# Patient Record
Sex: FEMALE | Race: WHITE | NOT HISPANIC OR LATINO | Employment: STUDENT | URBAN - METROPOLITAN AREA
[De-identification: names, ages, dates, MRNs, and addresses within clinical notes are randomized per-mention and may not be internally consistent; named-entity substitution may affect disease eponyms.]

---
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
## 2020-08-17 ENCOUNTER — OFFICE VISIT (OUTPATIENT)
Dept: FAMILY MEDICINE CLINIC | Facility: CLINIC | Age: 20
End: 2020-08-17

## 2020-08-17 VITALS
OXYGEN SATURATION: 98 % | DIASTOLIC BLOOD PRESSURE: 74 MMHG | BODY MASS INDEX: 18.97 KG/M2 | TEMPERATURE: 98.9 F | HEART RATE: 87 BPM | RESPIRATION RATE: 16 BRPM | SYSTOLIC BLOOD PRESSURE: 117 MMHG | WEIGHT: 125.2 LBS | HEIGHT: 68 IN

## 2020-08-17 DIAGNOSIS — Z00.01 ENCOUNTER FOR GENERAL ADULT MEDICAL EXAMINATION WITH ABNORMAL FINDINGS: Primary | ICD-10-CM

## 2020-08-17 DIAGNOSIS — R59.1 LYMPHADENOPATHY: ICD-10-CM

## 2020-08-17 LAB
ALBUMIN SERPL-MCNC: 4.5 G/DL (ref 3.5–5.2)
ALBUMIN/GLOB SERPL: 1.9 G/DL
ALP SERPL-CCNC: 61 U/L (ref 39–117)
ALT SERPL W P-5'-P-CCNC: 18 U/L (ref 1–33)
ANION GAP SERPL CALCULATED.3IONS-SCNC: 9.1 MMOL/L (ref 5–15)
AST SERPL-CCNC: 18 U/L (ref 1–32)
BASOPHILS # BLD AUTO: 0.02 10*3/MM3 (ref 0–0.2)
BASOPHILS NFR BLD AUTO: 0.3 % (ref 0–1.5)
BILIRUB SERPL-MCNC: 0.7 MG/DL (ref 0–1.2)
BUN SERPL-MCNC: 13 MG/DL (ref 6–20)
BUN/CREAT SERPL: 15.7 (ref 7–25)
CALCIUM SPEC-SCNC: 9.9 MG/DL (ref 8.6–10.5)
CHLORIDE SERPL-SCNC: 107 MMOL/L (ref 98–107)
CO2 SERPL-SCNC: 25.9 MMOL/L (ref 22–29)
CREAT SERPL-MCNC: 0.83 MG/DL (ref 0.57–1)
DEPRECATED RDW RBC AUTO: 41.6 FL (ref 37–54)
EOSINOPHIL # BLD AUTO: 0.05 10*3/MM3 (ref 0–0.4)
EOSINOPHIL NFR BLD AUTO: 0.8 % (ref 0.3–6.2)
ERYTHROCYTE [DISTWIDTH] IN BLOOD BY AUTOMATED COUNT: 12.3 % (ref 12.3–15.4)
GFR SERPL CREATININE-BSD FRML MDRD: 88 ML/MIN/1.73
GLOBULIN UR ELPH-MCNC: 2.4 GM/DL
GLUCOSE SERPL-MCNC: 89 MG/DL (ref 65–99)
HCT VFR BLD AUTO: 39.1 % (ref 34–46.6)
HGB BLD-MCNC: 12.9 G/DL (ref 12–15.9)
IMM GRANULOCYTES # BLD AUTO: 0.01 10*3/MM3 (ref 0–0.05)
IMM GRANULOCYTES NFR BLD AUTO: 0.2 % (ref 0–0.5)
LYMPHOCYTES # BLD AUTO: 1.43 10*3/MM3 (ref 0.7–3.1)
LYMPHOCYTES NFR BLD AUTO: 22.6 % (ref 19.6–45.3)
MCH RBC QN AUTO: 30.6 PG (ref 26.6–33)
MCHC RBC AUTO-ENTMCNC: 33 G/DL (ref 31.5–35.7)
MCV RBC AUTO: 92.7 FL (ref 79–97)
MONOCYTES # BLD AUTO: 0.44 10*3/MM3 (ref 0.1–0.9)
MONOCYTES NFR BLD AUTO: 6.9 % (ref 5–12)
NEUTROPHILS NFR BLD AUTO: 4.39 10*3/MM3 (ref 1.7–7)
NEUTROPHILS NFR BLD AUTO: 69.2 % (ref 42.7–76)
NRBC BLD AUTO-RTO: 0 /100 WBC (ref 0–0.2)
PLATELET # BLD AUTO: 153 10*3/MM3 (ref 140–450)
PMV BLD AUTO: 12.9 FL (ref 6–12)
POTASSIUM SERPL-SCNC: 5.1 MMOL/L (ref 3.5–5.2)
PROT SERPL-MCNC: 6.9 G/DL (ref 6–8.5)
RBC # BLD AUTO: 4.22 10*6/MM3 (ref 3.77–5.28)
SODIUM SERPL-SCNC: 142 MMOL/L (ref 136–145)
TSH SERPL DL<=0.05 MIU/L-ACNC: 2.58 UIU/ML (ref 0.27–4.2)
WBC # BLD AUTO: 6.34 10*3/MM3 (ref 3.4–10.8)

## 2020-08-17 PROCEDURE — 99214 OFFICE O/P EST MOD 30 MIN: CPT | Performed by: NURSE PRACTITIONER

## 2020-08-17 PROCEDURE — 85025 COMPLETE CBC W/AUTO DIFF WBC: CPT | Performed by: NURSE PRACTITIONER

## 2020-08-17 PROCEDURE — 80053 COMPREHEN METABOLIC PANEL: CPT | Performed by: NURSE PRACTITIONER

## 2020-08-17 PROCEDURE — 84443 ASSAY THYROID STIM HORMONE: CPT | Performed by: NURSE PRACTITIONER

## 2020-08-17 NOTE — PATIENT INSTRUCTIONS
Lymphadenopathy    Lymphadenopathy means that your lymph glands are swollen or larger than normal (enlarged). Lymph glands, also called lymph nodes, are collections of tissue that filter bacteria, viruses, and waste from your bloodstream. They are part of your body's disease-fighting system (immune system), which protects your body from germs.  There may be different causes of lymphadenopathy, depending on where it is in your body. Some types go away on their own. Lymphadenopathy can occur anywhere that you have lymph glands, including these areas:  · Neck (cervical lymphadenopathy).  · Chest (mediastinal lymphadenopathy).  · Lungs (hilar lymphadenopathy).  · Underarms (axillary lymphadenopathy).  · Groin (inguinal lymphadenopathy).  When your immune system responds to germs, infection-fighting cells and fluid build up in your lymph glands. This causes some swelling and enlargement. If the lymph glands do not go back to normal after you have an infection or disease, your health care provider may do tests. These tests help to monitor your condition and find the reason why the glands are still swollen and enlarged.  Follow these instructions at home:  · Get plenty of rest.  · Take over-the-counter and prescription medicines only as told by your health care provider. Your health care provider may recommend over-the-counter medicines for pain.  · If directed, apply heat to swollen lymph glands as often as told by your health care provider. Use the heat source that your health care provider recommends, such as a moist heat pack or a heating pad.  ? Place a towel between your skin and the heat source.  ? Leave the heat on for 20-30 minutes.  ? Remove the heat if your skin turns bright red. This is especially important if you are unable to feel pain, heat, or cold. You may have a greater risk of getting burned.  · Check your affected lymph glands every day for changes. Check other lymph gland areas as told by your health  care provider. Check for changes such as:  ? More swelling.  ? Sudden increase in size.  ? Redness or pain.  ? Hardness.  · Keep all follow-up visits as told by your health care provider. This is important.  Contact a health care provider if you have:  · Swelling that gets worse or spreads to other areas.  · Problems with breathing.  · Lymph glands that:  ? Are still swollen after 2 weeks.  ? Have suddenly gotten bigger.  ? Are red, painful, or hard.  · A fever or chills.  · Fatigue.  · A sore throat.  · Pain in your abdomen.  · Weight loss.  · Night sweats.  Get help right away if you have:  · Fluid leaking from an enlarged lymph gland.  · Severe pain.  · Chest pain.  · Shortness of breath.  Summary  · Lymphadenopathy means that your lymph glands are swollen or larger than normal (enlarged).  · Lymph glands (also called lymph nodes) are collections of tissue that filter bacteria, viruses, and waste from the bloodstream. They are part of your body's disease-fighting system (immune system).  · Lymphadenopathy can occur anywhere that you have lymph glands.  · If your enlarged and swollen lymph glands do not go back to normal after you have an infection or disease, your health care provider may do tests to monitor your condition and find the reason why the glands are still swollen and enlarged.  · Check your affected lymph glands every day for changes. Check other lymph gland areas as told by your health care provider.  This information is not intended to replace advice given to you by your health care provider. Make sure you discuss any questions you have with your health care provider.  Document Released: 09/26/2009 Document Revised: 11/30/2018 Document Reviewed: 11/02/2018  Elsevier Patient Education © 2020 [employer]

## 2020-08-17 NOTE — PROGRESS NOTES
"Subjective   Maleguille Soni is a 19 y.o. female presents for   Chief Complaint   Patient presents with   • Annual Exam   • Groin Swelling       Health Maintenance Due   Topic Date Due   • HPV VACCINES (1 - Female 2-dose series) 11/03/2010   • HEPATITIS C SCREENING  08/17/2020   • CHLAMYDIA SCREENING  08/17/2020   • INFLUENZA VACCINE  08/01/2020       History of Present Illness   Pt present for new patient exam.  She reports history of lymphnode swelling as a child with multiple labs completed, but does not remember treatment.  Today she reports right groin lymph node x 9 months but progressively worsening with rle pain and numbness.  She was seen at urgent care and prescribed keflex without improvement.  She reports ongoing nausea, that improves with avoidance of dairy products and denies recent fevers or other associated symptoms.  Pt denies new piercing or new tatoos.  Miscarriage 4/20, no D and C required and states she has not had a pelvic exam since the age of 16.  She denies vaginal discharge, itching, or odor.  LMP 8/5/20.  Pt works out in the gym several days a week and reports today felt a pop in right groin  and increase in pain in right groin that radiated down leg.  Vitals:    08/17/20 1342 08/17/20 1348   BP: 113/72 117/74   BP Location: Left arm Right arm   Patient Position: Sitting Sitting   Cuff Size: Adult Adult   Pulse: 88 87   Resp: 16    Temp: 98.9 °F (37.2 °C)    TempSrc: Infrared    SpO2: 98%    Weight: 56.8 kg (125 lb 3.2 oz)    Height: 172 cm (67.72\")      Body mass index is 19.2 kg/m².    Current Outpatient Medications on File Prior to Visit   Medication Sig Dispense Refill   • busPIRone (BUSPAR) 10 MG tablet Take 10 mg by mouth 3 (Three) Times a Day.     • [DISCONTINUED] cephalexin (KEFLEX) 500 MG capsule Take 1 capsule by mouth 3 (Three) Times a Day. 30 capsule 0     No current facility-administered medications on file prior to visit.        The following portions of the patient's " history were reviewed and updated as appropriate: allergies, current medications, past family history, past medical history, past social history, past surgical history and problem list.    Review of Systems   Constitutional: Negative for chills and fever.   HENT: Negative for sinus pressure and sore throat.    Eyes: Negative for blurred vision.   Respiratory: Negative for cough and shortness of breath.    Cardiovascular: Negative for chest pain.   Gastrointestinal: Positive for nausea. Negative for abdominal pain.   Endocrine: Negative.    Genitourinary:        Right groin swollen lymph node   Musculoskeletal: Negative for arthralgias and joint swelling.   Skin: Negative for color change.   Allergic/Immunologic: Negative.    Neurological: Positive for numbness (RLE). Negative for dizziness.   Hematological: Positive for adenopathy (right pelvis/groin area).   Psychiatric/Behavioral: Negative for behavioral problems.       Objective   Physical Exam   Constitutional: She is oriented to person, place, and time. She appears well-developed and well-nourished.   HENT:   Head: Normocephalic and atraumatic.   Right Ear: External ear normal.   Left Ear: External ear normal.   Nose: Nose normal.   Eyes: Pupils are equal, round, and reactive to light. Conjunctivae and EOM are normal.   Neck: Normal range of motion. Neck supple. No thyromegaly present.   Cardiovascular: Normal rate, regular rhythm, normal heart sounds and intact distal pulses.   Pulmonary/Chest: Effort normal and breath sounds normal.   Abdominal: Soft. Bowel sounds are normal. There is no hepatosplenomegaly. There is no tenderness. There is no guarding.   Musculoskeletal: Normal range of motion.   Bilateral lower extremity strength and DTR equal   Lymphadenopathy:     She has no cervical adenopathy.        Right: Inguinal (3 palpable, firm lymphnodes) adenopathy present.        Left: Inguinal (1 small firm lymphnode) adenopathy present.   Neurological: She is  alert and oriented to person, place, and time.   Skin: Skin is warm and dry.   Psychiatric: She has a normal mood and affect. Her behavior is normal. Judgment and thought content normal.   Nursing note and vitals reviewed.    PHQ-9 Total Score: 0    Assessment/Plan   Henrik was seen today for annual exam and groin swelling.    Diagnoses and all orders for this visit:    Encounter for general adult medical examination with abnormal findings  -     TSH    Lymphadenopathy  -     CBC Auto Differential  -     Comprehensive Metabolic Panel  -     CT Abdomen Pelvis With Contrast; Future        Patient Instructions   Lymphadenopathy    Lymphadenopathy means that your lymph glands are swollen or larger than normal (enlarged). Lymph glands, also called lymph nodes, are collections of tissue that filter bacteria, viruses, and waste from your bloodstream. They are part of your body's disease-fighting system (immune system), which protects your body from germs.  There may be different causes of lymphadenopathy, depending on where it is in your body. Some types go away on their own. Lymphadenopathy can occur anywhere that you have lymph glands, including these areas:  · Neck (cervical lymphadenopathy).  · Chest (mediastinal lymphadenopathy).  · Lungs (hilar lymphadenopathy).  · Underarms (axillary lymphadenopathy).  · Groin (inguinal lymphadenopathy).  When your immune system responds to germs, infection-fighting cells and fluid build up in your lymph glands. This causes some swelling and enlargement. If the lymph glands do not go back to normal after you have an infection or disease, your health care provider may do tests. These tests help to monitor your condition and find the reason why the glands are still swollen and enlarged.  Follow these instructions at home:  · Get plenty of rest.  · Take over-the-counter and prescription medicines only as told by your health care provider. Your health care provider may recommend  over-the-counter medicines for pain.  · If directed, apply heat to swollen lymph glands as often as told by your health care provider. Use the heat source that your health care provider recommends, such as a moist heat pack or a heating pad.  ? Place a towel between your skin and the heat source.  ? Leave the heat on for 20-30 minutes.  ? Remove the heat if your skin turns bright red. This is especially important if you are unable to feel pain, heat, or cold. You may have a greater risk of getting burned.  · Check your affected lymph glands every day for changes. Check other lymph gland areas as told by your health care provider. Check for changes such as:  ? More swelling.  ? Sudden increase in size.  ? Redness or pain.  ? Hardness.  · Keep all follow-up visits as told by your health care provider. This is important.  Contact a health care provider if you have:  · Swelling that gets worse or spreads to other areas.  · Problems with breathing.  · Lymph glands that:  ? Are still swollen after 2 weeks.  ? Have suddenly gotten bigger.  ? Are red, painful, or hard.  · A fever or chills.  · Fatigue.  · A sore throat.  · Pain in your abdomen.  · Weight loss.  · Night sweats.  Get help right away if you have:  · Fluid leaking from an enlarged lymph gland.  · Severe pain.  · Chest pain.  · Shortness of breath.  Summary  · Lymphadenopathy means that your lymph glands are swollen or larger than normal (enlarged).  · Lymph glands (also called lymph nodes) are collections of tissue that filter bacteria, viruses, and waste from the bloodstream. They are part of your body's disease-fighting system (immune system).  · Lymphadenopathy can occur anywhere that you have lymph glands.  · If your enlarged and swollen lymph glands do not go back to normal after you have an infection or disease, your health care provider may do tests to monitor your condition and find the reason why the glands are still swollen and enlarged.  · Check your  affected lymph glands every day for changes. Check other lymph gland areas as told by your health care provider.  This information is not intended to replace advice given to you by your health care provider. Make sure you discuss any questions you have with your health care provider.  Document Released: 09/26/2009 Document Revised: 11/30/2018 Document Reviewed: 11/02/2018  Elsevier Patient Education © 2020 [employer]

## 2020-10-13 ENCOUNTER — APPOINTMENT (OUTPATIENT)
Dept: CT IMAGING | Facility: HOSPITAL | Age: 20
End: 2020-10-13

## 2020-10-13 ENCOUNTER — HOSPITAL ENCOUNTER (EMERGENCY)
Facility: HOSPITAL | Age: 20
Discharge: HOME | End: 2020-10-13
Admitting: EMERGENCY MEDICINE

## 2020-10-13 VITALS
HEART RATE: 59 BPM | RESPIRATION RATE: 16 BRPM | OXYGEN SATURATION: 100 % | HEIGHT: 68 IN | TEMPERATURE: 98.4 F | WEIGHT: 123.68 LBS | BODY MASS INDEX: 18.74 KG/M2 | DIASTOLIC BLOOD PRESSURE: 82 MMHG | SYSTOLIC BLOOD PRESSURE: 135 MMHG

## 2020-10-13 DIAGNOSIS — R51.9 NONINTRACTABLE HEADACHE, UNSPECIFIED CHRONICITY PATTERN, UNSPECIFIED HEADACHE TYPE: ICD-10-CM

## 2020-10-13 DIAGNOSIS — S00.83XA CONTUSION OF JAW, INITIAL ENCOUNTER: Primary | ICD-10-CM

## 2020-10-13 DIAGNOSIS — M54.2 NECK PAIN: ICD-10-CM

## 2020-10-13 PROCEDURE — 99283 EMERGENCY DEPT VISIT LOW MDM: CPT

## 2020-10-13 PROCEDURE — 70486 CT MAXILLOFACIAL W/O DYE: CPT

## 2020-10-13 PROCEDURE — 70450 CT HEAD/BRAIN W/O DYE: CPT

## 2020-10-13 PROCEDURE — 72125 CT NECK SPINE W/O DYE: CPT

## 2020-10-13 RX ORDER — ACETAMINOPHEN 500 MG
1000 TABLET ORAL ONCE
Status: COMPLETED | OUTPATIENT
Start: 2020-10-13 | End: 2020-10-13

## 2020-10-13 RX ADMIN — ACETAMINOPHEN 1000 MG: 500 TABLET, FILM COATED ORAL at 14:31

## 2020-10-13 NOTE — ED PROVIDER NOTES
Subjective   Chief Complaint: Headache    Patient is a 19 year old  female with no significant past medical history presents with headache and chin pain after falling today on her skateboard.  Patient states that she was on her skateboard when she fell forward hitting her chin on the pavement.  Patient denies LOC or syncope.  Patient states that she has pain to her chin, scribes as a constant throbbing pain that she rates 6/10.  Patient also complains of headache that is all over, denies thunderclap onset or worst headache of her life.  She rates her headache a 5/10.  Patient also reports some neck stiffness, states that she has had neck problems for a couple months, currently sees a chiropractor, states she was adjusted yesterday.  She denies any blurry vision or dizziness, does report some lightheadedness.  Patient denies any nausea vomiting.  Patient denies any numbness and tingling in her extremities.    Location: Head, jaw    Quality: Throbbing    Duration: Today    Timing: Constant    Severity: Moderate    Associated Symptoms: Lightheadedness    PCP: Joycelyn Barraza      History provided by:  Patient      Review of Systems   Constitutional: Negative for fever.   HENT: Negative for sore throat and trouble swallowing.    Respiratory: Negative for shortness of breath and wheezing.    Cardiovascular: Negative for chest pain.   Gastrointestinal: Negative for abdominal pain.   Genitourinary: Negative for dysuria.   Musculoskeletal: Positive for neck stiffness.        Chin/jaw pain   Skin: Negative for rash.   Neurological: Positive for light-headedness and headaches.   Psychiatric/Behavioral: Negative for behavioral problems.   All other systems reviewed and are negative.      Past Medical History:   Diagnosis Date   • Anxiety    • Depression    • Miscarriage        Allergies   Allergen Reactions   • Latex Hives   • Menthol Hives and GI Intolerance       Past Surgical History:   Procedure Laterality Date   •  SHOULDER ARTHROSCOPY         Family History   Problem Relation Age of Onset   • Mental illness Mother    • Stroke Mother    • Heart disease Father    • Hypertension Father    • Mental illness Sister    • Cancer Maternal Grandmother         colon   • Cancer Maternal Grandfather         bone   • Cancer Paternal Grandfather         prostate       Social History     Socioeconomic History   • Marital status: Single     Spouse name: Not on file   • Number of children: Not on file   • Years of education: Not on file   • Highest education level: Not on file   Tobacco Use   • Smoking status: Never Smoker   • Smokeless tobacco: Never Used   Substance and Sexual Activity   • Alcohol use: Yes     Frequency: Monthly or less     Drinks per session: 3 or 4     Binge frequency: Never   • Drug use: Never   • Sexual activity: Yes     Partners: Male     Birth control/protection: Condom           Objective   Physical Exam  Vitals signs reviewed.   Constitutional:       Appearance: Normal appearance. She is well-developed and normal weight. She is not ill-appearing or toxic-appearing.   HENT:      Head: Normocephalic.      Comments: Abrasion with soft tissue swelling to chin.  Patient has symmetric jaw movements, no palpable click at TMJ with jaw opening  Eyes:      Pupils: Pupils are equal, round, and reactive to light.   Neck:      Musculoskeletal: Normal range of motion. No neck rigidity or muscular tenderness.      Comments: Cervical spine: No midline tenderness to palpation. No step-offs or deformities. Pain-free range of motion.  Cardiovascular:      Rate and Rhythm: Normal rate and regular rhythm.      Pulses: Normal pulses.      Heart sounds: Normal heart sounds.   Pulmonary:      Effort: Pulmonary effort is normal. No respiratory distress.      Breath sounds: Normal breath sounds. No wheezing.   Abdominal:      General: Bowel sounds are normal.      Palpations: Abdomen is soft.      Tenderness: There is no abdominal tenderness.  "  Musculoskeletal:      Comments: Lumbar spine: No step-offs or deformities, no midline tenderness to palpation.  Bilateral lower extremities: Negative straight leg raise.  5 out of 5 strength.  Sensation intact to light touch.     Skin:     General: Skin is warm and dry.      Capillary Refill: Capillary refill takes less than 2 seconds.      Findings: No rash.      Comments: Superficial abrasion with underlying soft tissue swelling to chin   Neurological:      General: No focal deficit present.      Mental Status: She is alert and oriented to person, place, and time.      Sensory: No sensory deficit.      Motor: No weakness.      Comments: Speech is clear   Psychiatric:         Mood and Affect: Mood normal.         Behavior: Behavior normal.         Procedures           ED Course    /82   Pulse 59   Temp 98.4 °F (36.9 °C) (Oral)   Resp 16   Ht 172.7 cm (68\")   Wt 56.1 kg (123 lb 10.9 oz)   LMP 09/22/2020 (Approximate)   SpO2 100%   Breastfeeding No   BMI 18.81 kg/m²   Labs Reviewed - No data to display  Medications   acetaminophen (TYLENOL) tablet 1,000 mg (1,000 mg Oral Given 10/13/20 1431)     Ct Head Without Contrast    Result Date: 10/13/2020  Normal study.  Electronically Signed ByConchita Ramirez On:10/13/2020 3:41 PM This report was finalized on [id number] by  Bryan Ramirez, .    Ct Cervical Spine Without Contrast    Result Date: 10/13/2020  No acute fracture or dislocation.  Electronically Signed By-Bryan Ramirez On:10/13/2020 3:46 PM This report was finalized on [id number] by  Bryan Ramirez .    Ct Facial Bones Without Contrast    Result Date: 10/13/2020   No acute facial bone fracture.  Electronically Signed ByConchita Ramirez On:10/13/2020 3:44 PM This report was finalized on [id number] by  Bryan Ramirez .                                           MDM  Number of Diagnoses or Management Options  Contusion of jaw, initial encounter:   Neck pain:   Nonintractable headache, unspecified chronicity " pattern, unspecified headache type:   Diagnosis management comments: MEDICAL DECISION  Epic Chart Review:  Comorbidities: Patient denies  Differentials: fracture, jaw dislocation, concussion, intracranial hemorrhage ; this list is not all inclusive and does not constitute the entirety of considered causes  Radiology interpretation:  Images reviewed by me and interpreted by radiologist,   CT head shows no acute intracranial abnormalities.  CT cervical spine shows no acute fracture or dislocation.  CT facial bones shows no acute facial bone fracture.  Lab interpretation: Not warranted    While in the ED  appropriate PPE was worn during exam and throughout all encounters with the patient.  Patient had the patient.  Patient exam is benign with exception of superficial abrasion to chin with underlying soft tissue swelling.  Patient has no focal neurological deficits.  CT head, cervical spine and facial bones is within normal limits.  Patient was given Tylenol for headache.  Patient report improvement in her headache after taking this.  Patient most likely has jaw contusion, possible mild concussion as she still complains of some headache and mild nausea.  Patient continued to have normal neurological exam while in the ER.  Patient will be discharged with head injury precautions, given work note today.  Patient was encouraged to follow-up with primary care for further evaluation.    Discharge plan and instructions were discussed with the patient who verbalized understanding and is in agreement with the plan, all questions were answered at this time.  Patient is aware of signs symptoms that would require immediate return to the emergency room.  Patient understands importance of following up with primary care provider for further evaluation and worsening concerns as well as blood pressure recheck in the next 4 weeks.    Patient remained afebrile, nontoxic-appearing, no acute respiratory distress throughout entire emergency  room stay.  Patient was discharged in improved stable condition with an upright steady gait.         Amount and/or Complexity of Data Reviewed  Tests in the radiology section of CPT®: reviewed and ordered    Patient Progress  Patient progress: stable      Final diagnoses:   Contusion of jaw, initial encounter   Nonintractable headache, unspecified chronicity pattern, unspecified headache type   Neck pain                   Karly Guerrero PA  10/13/20 4808

## 2020-10-13 NOTE — DISCHARGE INSTRUCTIONS
Take Tylenol as needed for headache or pain.  Maximum of 4 g/day  Get plenty of rest, avoid excessive phone screens or TV screens as this could in your eyes or make your headache worse.  Drink plenty of fluids.    Follow-up with primary care as needed for new or worsening concerns as well as blood pressure not to the next 4 weeks.    Return to the ER for new or worsening symptoms.

## 2020-11-09 ENCOUNTER — TELEPHONE (OUTPATIENT)
Dept: FAMILY MEDICINE CLINIC | Facility: CLINIC | Age: 20
End: 2020-11-09

## 2020-11-09 NOTE — TELEPHONE ENCOUNTER
Please notify pt and ask if she is continuing to have symptoms.  If so, will try to get an ultrasound.

## 2020-11-09 NOTE — TELEPHONE ENCOUNTER
SPOKE WITH HER MOTHER PER ANA MARÍA AND SHE IS STILL HAVING ISSUES AND ALSO BLOATING. THEY WANT TO HAVE THE US DONE AT [facility].

## 2020-11-10 DIAGNOSIS — R14.0 BLOATING: ICD-10-CM

## 2020-11-10 DIAGNOSIS — R11.0 NAUSEA: ICD-10-CM

## 2020-11-10 DIAGNOSIS — R59.1 LYMPHADENOPATHY: Primary | ICD-10-CM

## 2020-11-10 NOTE — TELEPHONE ENCOUNTER
Order for ultrasound entered and referral to GI for bloating and reports on frequent nausea at last appt.

## 2020-11-16 ENCOUNTER — HOSPITAL ENCOUNTER (OUTPATIENT)
Dept: ULTRASOUND IMAGING | Facility: HOSPITAL | Age: 20
Discharge: HOME | End: 2020-11-16
Admitting: NURSE PRACTITIONER

## 2020-11-16 DIAGNOSIS — R59.1 LYMPHADENOPATHY: ICD-10-CM

## 2020-11-16 PROCEDURE — 76882 US LMTD JT/FCL EVL NVASC XTR: CPT

## 2021-01-21 ENCOUNTER — OFFICE (AMBULATORY)
Dept: URBAN - METROPOLITAN AREA CLINIC 64 | Facility: CLINIC | Age: 21
End: 2021-01-21
Payer: COMMERCIAL

## 2021-01-21 ENCOUNTER — TRANSCRIBE ORDERS (OUTPATIENT)
Dept: ADMINISTRATIVE | Facility: HOSPITAL | Age: 21
End: 2021-01-21

## 2021-01-21 VITALS
HEART RATE: 68 BPM | DIASTOLIC BLOOD PRESSURE: 60 MMHG | HEIGHT: 69 IN | WEIGHT: 128 LBS | SYSTOLIC BLOOD PRESSURE: 113 MMHG

## 2021-01-21 DIAGNOSIS — R19.4 CHANGE IN BOWEL HABIT: ICD-10-CM

## 2021-01-21 DIAGNOSIS — R10.9 UNSPECIFIED ABDOMINAL PAIN: ICD-10-CM

## 2021-01-21 DIAGNOSIS — R11.0 NAUSEA: Primary | ICD-10-CM

## 2021-01-21 DIAGNOSIS — R10.11 RIGHT UPPER QUADRANT ABDOMINAL PAIN: ICD-10-CM

## 2021-01-21 DIAGNOSIS — R14.0 ABDOMINAL DISTENSION (GASEOUS): ICD-10-CM

## 2021-01-21 DIAGNOSIS — R11.0 NAUSEA: ICD-10-CM

## 2021-01-21 PROCEDURE — 99203 OFFICE O/P NEW LOW 30 MIN: CPT | Performed by: NURSE PRACTITIONER

## 2021-01-28 ENCOUNTER — HOSPITAL ENCOUNTER (OUTPATIENT)
Dept: NUCLEAR MEDICINE | Facility: HOSPITAL | Age: 21
Discharge: HOME | End: 2021-01-28

## 2021-01-28 ENCOUNTER — HOSPITAL ENCOUNTER (OUTPATIENT)
Dept: ULTRASOUND IMAGING | Facility: HOSPITAL | Age: 21
Discharge: HOME | End: 2021-01-28
Admitting: NURSE PRACTITIONER

## 2021-01-28 DIAGNOSIS — R11.0 NAUSEA: ICD-10-CM

## 2021-01-28 DIAGNOSIS — R10.11 RIGHT UPPER QUADRANT ABDOMINAL PAIN: ICD-10-CM

## 2021-01-28 PROCEDURE — 0 TECHNETIUM TC 99M MEBROFENIN KIT: Performed by: NURSE PRACTITIONER

## 2021-01-28 PROCEDURE — 76705 ECHO EXAM OF ABDOMEN: CPT

## 2021-01-28 PROCEDURE — A9537 TC99M MEBROFENIN: HCPCS | Performed by: NURSE PRACTITIONER

## 2021-01-28 PROCEDURE — 78227 HEPATOBIL SYST IMAGE W/DRUG: CPT

## 2021-01-28 RX ORDER — KIT FOR THE PREPARATION OF TECHNETIUM TC 99M MEBROFENIN 45 MG/10ML
1 INJECTION, POWDER, LYOPHILIZED, FOR SOLUTION INTRAVENOUS
Status: COMPLETED | OUTPATIENT
Start: 2021-01-28 | End: 2021-01-28

## 2021-01-28 RX ADMIN — MEBROFENIN 1 DOSE: 45 INJECTION, POWDER, LYOPHILIZED, FOR SOLUTION INTRAVENOUS at 10:00

## 2021-05-03 ENCOUNTER — OFFICE (AMBULATORY)
Dept: URBAN - METROPOLITAN AREA CLINIC 64 | Facility: CLINIC | Age: 21
End: 2021-05-03
Payer: COMMERCIAL

## 2021-05-03 VITALS
HEIGHT: 69 IN | DIASTOLIC BLOOD PRESSURE: 61 MMHG | SYSTOLIC BLOOD PRESSURE: 98 MMHG | HEART RATE: 67 BPM | WEIGHT: 124 LBS

## 2021-05-03 DIAGNOSIS — R14.0 ABDOMINAL DISTENSION (GASEOUS): ICD-10-CM

## 2021-05-03 DIAGNOSIS — R10.13 EPIGASTRIC PAIN: ICD-10-CM

## 2021-05-03 DIAGNOSIS — R11.0 NAUSEA: ICD-10-CM

## 2021-05-03 PROCEDURE — 99213 OFFICE O/P EST LOW 20 MIN: CPT | Performed by: NURSE PRACTITIONER

## 2021-06-17 ENCOUNTER — OFFICE VISIT (OUTPATIENT)
Dept: FAMILY MEDICINE CLINIC | Facility: CLINIC | Age: 21
End: 2021-06-17

## 2021-06-17 VITALS
SYSTOLIC BLOOD PRESSURE: 121 MMHG | DIASTOLIC BLOOD PRESSURE: 71 MMHG | HEIGHT: 68 IN | BODY MASS INDEX: 18.97 KG/M2 | HEART RATE: 63 BPM | WEIGHT: 125.2 LBS | TEMPERATURE: 98.2 F | OXYGEN SATURATION: 100 %

## 2021-06-17 DIAGNOSIS — F41.9 ANXIETY: ICD-10-CM

## 2021-06-17 DIAGNOSIS — R53.83 FATIGUE, UNSPECIFIED TYPE: ICD-10-CM

## 2021-06-17 DIAGNOSIS — N93.9 MENSTRUAL BLEEDING PROBLEM: Primary | ICD-10-CM

## 2021-06-17 LAB
ALBUMIN SERPL-MCNC: 4.7 G/DL (ref 3.5–5.2)
ALBUMIN/GLOB SERPL: 2 G/DL
ALP SERPL-CCNC: 72 U/L (ref 39–117)
ALT SERPL W P-5'-P-CCNC: 16 U/L (ref 1–33)
ANION GAP SERPL CALCULATED.3IONS-SCNC: 7.8 MMOL/L (ref 5–15)
AST SERPL-CCNC: 19 U/L (ref 1–32)
BASOPHILS # BLD AUTO: 0.02 10*3/MM3 (ref 0–0.2)
BASOPHILS NFR BLD AUTO: 0.6 % (ref 0–1.5)
BILIRUB SERPL-MCNC: 1 MG/DL (ref 0–1.2)
BUN SERPL-MCNC: 6 MG/DL (ref 6–20)
BUN/CREAT SERPL: 8.8 (ref 7–25)
CALCIUM SPEC-SCNC: 9.8 MG/DL (ref 8.6–10.5)
CHLORIDE SERPL-SCNC: 104 MMOL/L (ref 98–107)
CHROMATIN AB SERPL-ACNC: 22.3 IU/ML (ref 0–14)
CO2 SERPL-SCNC: 27.2 MMOL/L (ref 22–29)
CREAT SERPL-MCNC: 0.68 MG/DL (ref 0.57–1)
CRP SERPL-MCNC: <0.3 MG/DL (ref 0–0.5)
DEPRECATED RDW RBC AUTO: 40.1 FL (ref 37–54)
EOSINOPHIL # BLD AUTO: 0.05 10*3/MM3 (ref 0–0.4)
EOSINOPHIL NFR BLD AUTO: 1.6 % (ref 0.3–6.2)
ERYTHROCYTE [DISTWIDTH] IN BLOOD BY AUTOMATED COUNT: 12.1 % (ref 12.3–15.4)
ERYTHROCYTE [SEDIMENTATION RATE] IN BLOOD: 4 MM/HR (ref 0–20)
GFR SERPL CREATININE-BSD FRML MDRD: 109 ML/MIN/1.73
GLOBULIN UR ELPH-MCNC: 2.4 GM/DL
GLUCOSE SERPL-MCNC: 70 MG/DL (ref 65–99)
HCG INTACT+B SERPL-ACNC: <0.5 MIU/ML
HCT VFR BLD AUTO: 40.7 % (ref 34–46.6)
HGB BLD-MCNC: 13.6 G/DL (ref 12–15.9)
IMM GRANULOCYTES # BLD AUTO: 0.01 10*3/MM3 (ref 0–0.05)
IMM GRANULOCYTES NFR BLD AUTO: 0.3 % (ref 0–0.5)
LYMPHOCYTES # BLD AUTO: 1 10*3/MM3 (ref 0.7–3.1)
LYMPHOCYTES NFR BLD AUTO: 32.1 % (ref 19.6–45.3)
MCH RBC QN AUTO: 30.6 PG (ref 26.6–33)
MCHC RBC AUTO-ENTMCNC: 33.4 G/DL (ref 31.5–35.7)
MCV RBC AUTO: 91.7 FL (ref 79–97)
MONOCYTES # BLD AUTO: 0.25 10*3/MM3 (ref 0.1–0.9)
MONOCYTES NFR BLD AUTO: 8 % (ref 5–12)
NEUTROPHILS NFR BLD AUTO: 1.79 10*3/MM3 (ref 1.7–7)
NEUTROPHILS NFR BLD AUTO: 57.4 % (ref 42.7–76)
NRBC BLD AUTO-RTO: 0 /100 WBC (ref 0–0.2)
PLATELET # BLD AUTO: 151 10*3/MM3 (ref 140–450)
PMV BLD AUTO: 12.7 FL (ref 6–12)
POTASSIUM SERPL-SCNC: 4.4 MMOL/L (ref 3.5–5.2)
PROT SERPL-MCNC: 7.1 G/DL (ref 6–8.5)
RBC # BLD AUTO: 4.44 10*6/MM3 (ref 3.77–5.28)
SODIUM SERPL-SCNC: 139 MMOL/L (ref 136–145)
WBC # BLD AUTO: 3.12 10*3/MM3 (ref 3.4–10.8)

## 2021-06-17 PROCEDURE — 86140 C-REACTIVE PROTEIN: CPT | Performed by: NURSE PRACTITIONER

## 2021-06-17 PROCEDURE — 84702 CHORIONIC GONADOTROPIN TEST: CPT | Performed by: NURSE PRACTITIONER

## 2021-06-17 PROCEDURE — 85652 RBC SED RATE AUTOMATED: CPT | Performed by: NURSE PRACTITIONER

## 2021-06-17 PROCEDURE — 86431 RHEUMATOID FACTOR QUANT: CPT | Performed by: NURSE PRACTITIONER

## 2021-06-17 PROCEDURE — 99214 OFFICE O/P EST MOD 30 MIN: CPT | Performed by: NURSE PRACTITIONER

## 2021-06-17 PROCEDURE — 86038 ANTINUCLEAR ANTIBODIES: CPT | Performed by: NURSE PRACTITIONER

## 2021-06-17 PROCEDURE — 80053 COMPREHEN METABOLIC PANEL: CPT | Performed by: NURSE PRACTITIONER

## 2021-06-17 PROCEDURE — 85025 COMPLETE CBC W/AUTO DIFF WBC: CPT | Performed by: NURSE PRACTITIONER

## 2021-06-17 RX ORDER — BUSPIRONE HYDROCHLORIDE 7.5 MG/1
7.5 TABLET ORAL 3 TIMES DAILY
Qty: 90 TABLET | Refills: 1 | Status: SHIPPED | OUTPATIENT
Start: 2021-06-17 | End: 2023-03-05

## 2021-06-17 NOTE — PATIENT INSTRUCTIONS
Abnormal Uterine Bleeding  Abnormal uterine bleeding means bleeding more than usual from your womb (uterus). It can include:  · Bleeding between menstrual periods.  · Bleeding after sex.  · Bleeding that is heavier than normal.  · Menstrual periods that last longer than usual.  · Bleeding after you have stopped having your menstrual period (menopause).  There are many problems that may cause this. You should see a doctor for any kind of bleeding that is not normal. Treatment depends on the cause of the bleeding.  Follow these instructions at home:  Medicines  · Take over-the-counter and prescription medicines only as told by your doctor.  · Tell your doctor about other medicines that you take.  ? If told by your doctor, stop taking aspirin or medicines that have aspirin in them. These medicines can make you bleed more.  · You may be given iron pills to replace iron that your body loses because of this condition. Take them as told by your doctor.  Managing constipation  If you are taking iron pills, you may have trouble pooping (constipation). To prevent or treat trouble pooping, you may need to:  · Drink enough fluid to keep your pee (urine) pale yellow.  · Take over-the-counter or prescription medicines.  · Eat foods that are high in fiber. These include beans, whole grains, and fresh fruits and vegetables.  · Limit foods that are high in fat and sugar. These include fried or sweet foods.  General instructions  · Watch your condition for any changes.  · Do not use tampons, douche, or have sex, if your doctor tells you not to.  · Change your pads often.  · Get regular exams. This includes pelvic exams and cervical cancer screenings.  ? It is up to you to get the results of any tests that are done. Ask your doctor, or the department that is doing the tests, when your results will be ready.  · Keep all follow-up visits as told by your doctor. This is important.  Contact a doctor if:  · The bleeding lasts more than 1  week.  · You feel dizzy at times.  · You feel like you may vomit (nausea).  · You vomit.  · You feel light-headed or weak.  · Your symptoms get worse.  Get help right away if:  · You pass out.  · You have to change pads every hour.  · You have pain in your belly.  · You have a fever or chills.  · You get sweaty.  · You get weak.  · You pass large blood clots from your vagina.  Summary  · Abnormal uterine bleeding means bleeding more than usual from your womb (uterus).  · Any kind of bleeding that is not normal should be checked by a doctor.  · Treatment depends on the cause of the bleeding.  · Get help right away if you pass out, you have to change pads every hour, or you pass large blood clots from your vagina.  This information is not intended to replace advice given to you by your health care provider. Make sure you discuss any questions you have with your health care provider.  Document Revised: 10/20/2020 Document Reviewed: 10/20/2020  Elsevier Patient Education © 2021 Elsevier Inc.

## 2021-06-17 NOTE — PROGRESS NOTES
Subjective   Maleree JUVE Soni is a 20 y.o. female presents for   Chief Complaint   Patient presents with   • Menstrual Problem     Started this month- Clots, Severe cramps and started about a week earlier than normal       Health Maintenance Due   Topic Date Due   • COVID-19 Vaccine (1) Never done   • HEPATITIS C SCREENING  Never done   • CHLAMYDIA SCREENING  Never done   • PAP SMEAR  Never done       History of Present Illness   Pt present with concerns from her menstrual cycle.  She states she started her period early and has been cramping more than usual the past few days.  She states she had several large clots, but cramping is improving and states the flow has also lightened up.  She has had a miscarriage in the past at 8 weeks.  She does not think she was pregnant but states she has never used birth control.  She reports concern that she was around people with the covid vaccine recently and that may have affected her.  She does not currently have a gynocologist.  She has not received the vaccine herself.  She also reports anxiety at times and has been working to find a counselor.  She recently contacted [facility] and is awaiting appointment.  She states she took buspar in the past and didn't feel it was very effective, but took a low dose.  She would like something prn to help with anxiety worsens.  Discussed dosing of buspar with her and she states she will try a higher dose.  She states she has ongoing fatigue and is concerned due to the lymphnode in her groin is still present, but unchanged.  Ultrasound performed previously indicated it was benign.  Instructed pt to monitor for change in size, tenderness, and mobility and call for any changes.  Will also evaluate labs.  Vitals:    06/17/21 0942 06/17/21 0944   BP: 113/72 121/71   BP Location: Right arm Left arm   Patient Position: Sitting Sitting   Cuff Size: Adult Adult   Pulse: 63    Temp: 98.2 °F (36.8 °C)    TempSrc: Oral    SpO2: 100%    Weight: 56.8  "kg (125 lb 3.2 oz)    Height: 172.7 cm (67.99\")      Body mass index is 19.04 kg/m².    Current Outpatient Medications on File Prior to Visit   Medication Sig Dispense Refill   • [DISCONTINUED] busPIRone (BUSPAR) 10 MG tablet Take 10 mg by mouth 3 (Three) Times a Day.       No current facility-administered medications on file prior to visit.       The following portions of the patient's history were reviewed and updated as appropriate: allergies, current medications, past family history, past medical history, past social history, past surgical history, and problem list.    Review of Systems   Constitutional: Positive for fatigue. Negative for chills and fever.   HENT: Negative for sinus pressure and sore throat.    Eyes: Negative for blurred vision.   Respiratory: Negative for cough and shortness of breath.    Cardiovascular: Negative for chest pain.   Gastrointestinal: Negative for abdominal pain.   Endocrine: Negative.    Genitourinary: Positive for menstrual problem.   Musculoskeletal: Negative for arthralgias and joint swelling.   Skin: Negative for color change.   Allergic/Immunologic: Negative.    Neurological: Negative for dizziness.   Hematological: Negative.    Psychiatric/Behavioral: Negative for behavioral problems. The patient is nervous/anxious.        Objective   Physical Exam  Vitals and nursing note reviewed.   Constitutional:       Appearance: Normal appearance. She is well-developed.   HENT:      Head: Normocephalic and atraumatic.      Right Ear: Tympanic membrane, ear canal and external ear normal.      Left Ear: Tympanic membrane, ear canal and external ear normal.      Nose: Nose normal.   Eyes:      Extraocular Movements: Extraocular movements intact.      Conjunctiva/sclera: Conjunctivae normal.      Pupils: Pupils are equal, round, and reactive to light.   Cardiovascular:      Rate and Rhythm: Normal rate and regular rhythm.      Pulses: Normal pulses.      Heart sounds: Normal heart sounds. "   Pulmonary:      Effort: Pulmonary effort is normal.      Breath sounds: Normal breath sounds.   Abdominal:      General: Bowel sounds are normal.      Palpations: Abdomen is soft.   Genitourinary:     Vagina: Normal.   Musculoskeletal:         General: Normal range of motion.      Cervical back: Normal range of motion and neck supple.   Lymphadenopathy:      Lower Body: Right inguinal adenopathy (nontender, mobile) present.   Skin:     General: Skin is warm and dry.   Neurological:      General: No focal deficit present.      Mental Status: She is alert and oriented to person, place, and time.   Psychiatric:         Mood and Affect: Mood normal.         Behavior: Behavior normal.         Thought Content: Thought content normal.         Judgment: Judgment normal.       PHQ-9 Total Score:      Assessment/Plan   Diagnoses and all orders for this visit:    1. Menstrual bleeding problem (Primary)  -     CBC Auto Differential  -     Comprehensive Metabolic Panel  -     hCG, Quantitative, Pregnancy  -     Ambulatory Referral to Gynecology    2. Fatigue, unspecified type  -     CONCEPCIÓN  -     Rheumatoid Factor  -     C-reactive Protein  -     Sedimentation Rate    3. Anxiety  -     busPIRone (BUSPAR) 7.5 MG tablet; Take 1 tablet by mouth 3 (Three) Times a Day.  Dispense: 90 tablet; Refill: 1        Patient Instructions   Abnormal Uterine Bleeding  Abnormal uterine bleeding means bleeding more than usual from your womb (uterus). It can include:  · Bleeding between menstrual periods.  · Bleeding after sex.  · Bleeding that is heavier than normal.  · Menstrual periods that last longer than usual.  · Bleeding after you have stopped having your menstrual period (menopause).  There are many problems that may cause this. You should see a doctor for any kind of bleeding that is not normal. Treatment depends on the cause of the bleeding.  Follow these instructions at home:  Medicines  · Take over-the-counter and prescription medicines  only as told by your doctor.  · Tell your doctor about other medicines that you take.  ? If told by your doctor, stop taking aspirin or medicines that have aspirin in them. These medicines can make you bleed more.  · You may be given iron pills to replace iron that your body loses because of this condition. Take them as told by your doctor.  Managing constipation  If you are taking iron pills, you may have trouble pooping (constipation). To prevent or treat trouble pooping, you may need to:  · Drink enough fluid to keep your pee (urine) pale yellow.  · Take over-the-counter or prescription medicines.  · Eat foods that are high in fiber. These include beans, whole grains, and fresh fruits and vegetables.  · Limit foods that are high in fat and sugar. These include fried or sweet foods.  General instructions  · Watch your condition for any changes.  · Do not use tampons, douche, or have sex, if your doctor tells you not to.  · Change your pads often.  · Get regular exams. This includes pelvic exams and cervical cancer screenings.  ? It is up to you to get the results of any tests that are done. Ask your doctor, or the department that is doing the tests, when your results will be ready.  · Keep all follow-up visits as told by your doctor. This is important.  Contact a doctor if:  · The bleeding lasts more than 1 week.  · You feel dizzy at times.  · You feel like you may vomit (nausea).  · You vomit.  · You feel light-headed or weak.  · Your symptoms get worse.  Get help right away if:  · You pass out.  · You have to change pads every hour.  · You have pain in your belly.  · You have a fever or chills.  · You get sweaty.  · You get weak.  · You pass large blood clots from your vagina.  Summary  · Abnormal uterine bleeding means bleeding more than usual from your womb (uterus).  · Any kind of bleeding that is not normal should be checked by a doctor.  · Treatment depends on the cause of the bleeding.  · Get help right  away if you pass out, you have to change pads every hour, or you pass large blood clots from your vagina.  This information is not intended to replace advice given to you by your health care provider. Make sure you discuss any questions you have with your health care provider.  Document Revised: 10/20/2020 Document Reviewed: 10/20/2020  [employer] Patient Education © 2021 Elsevier Inc.

## 2021-06-18 LAB — ANA SER QL: NEGATIVE

## 2021-06-21 DIAGNOSIS — R76.8 ELEVATED RHEUMATOID FACTOR: Primary | ICD-10-CM

## 2021-07-16 ENCOUNTER — TELEPHONE (OUTPATIENT)
Dept: FAMILY MEDICINE CLINIC | Facility: CLINIC | Age: 21
End: 2021-07-16

## 2021-07-16 NOTE — TELEPHONE ENCOUNTER
Caller: Henrik Soni    Relationship: Self    Best call back number: [phone]      What specialty or service is being requested: RHEUMATOLOGY       Any additional details: PATIENT STATEDE THAT THE RHEUMATOLOGY OFFICE THAT SHE WAS REFERRED TO IS TOO FAR FOR HER TO TRAVEL TO AND SHE WOULD LIKE TO SEE IF SHE CAN BE REFERRED TO A CLOSER OFFICE

## 2021-07-16 NOTE — TELEPHONE ENCOUNTER
I called and spoke with pt, and advised that this Rheumatology office in [place] is who her insurance prefers for her to see. She verbalized understanding, and will call and schedule appt with them.

## 2021-07-30 ENCOUNTER — HOSPITAL ENCOUNTER (OUTPATIENT)
Dept: GENERAL RADIOLOGY | Facility: HOSPITAL | Age: 21
Discharge: HOME | End: 2021-07-30

## 2021-07-30 ENCOUNTER — TRANSCRIBE ORDERS (OUTPATIENT)
Dept: ADMINISTRATIVE | Facility: HOSPITAL | Age: 21
End: 2021-07-30

## 2021-07-30 DIAGNOSIS — M25.50 PAIN IN JOINT, MULTIPLE SITES: ICD-10-CM

## 2021-07-30 DIAGNOSIS — M54.2 CERVICALGIA: ICD-10-CM

## 2021-07-30 DIAGNOSIS — M25.50 PAIN IN JOINT, MULTIPLE SITES: Primary | ICD-10-CM

## 2021-07-30 PROCEDURE — 73130 X-RAY EXAM OF HAND: CPT

## 2021-07-30 PROCEDURE — 72050 X-RAY EXAM NECK SPINE 4/5VWS: CPT

## 2021-07-30 PROCEDURE — 73110 X-RAY EXAM OF WRIST: CPT

## 2021-07-30 PROCEDURE — 72202 X-RAY EXAM SI JOINTS 3/> VWS: CPT

## 2021-07-30 PROCEDURE — 73610 X-RAY EXAM OF ANKLE: CPT

## 2021-07-30 PROCEDURE — 73630 X-RAY EXAM OF FOOT: CPT

## 2021-08-20 ENCOUNTER — TELEPHONE (OUTPATIENT)
Dept: FAMILY MEDICINE CLINIC | Facility: CLINIC | Age: 21
End: 2021-08-20

## 2021-08-20 NOTE — TELEPHONE ENCOUNTER
Caller: Henrik Soni    Relationship: Self    Best call back number: [phone]    What medication are you requesting: EMERGENCY CONTRICEPTIVE    Have you had these symptoms before:    [] Yes  [x] No    Have you been treated for these symptoms before:   [] Yes  [x] No    If a prescription is needed, what is your preferred pharmacy and phone number: [id number] - [facility] [place]  [address] [phone] [facility] [phone]

## 2021-08-20 NOTE — TELEPHONE ENCOUNTER
"Pt notified and understands.    Pt called back about 10 minutes later, and was very upset and demanded to know the reasoning behind the denial. I advised that the Message said that \"This is not something that [facility] prescribes, but is available OTC\". Pt said that the problem with this was the last time that she needed this medication called in. Prescription was sent by a provider, and ended up only being $10 vs $50 OTC.     As that point I placed call on hold and asked  to take the phone call.  "
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
## 2021-09-15 ENCOUNTER — TREATMENT (OUTPATIENT)
Dept: PHYSICAL THERAPY | Facility: CLINIC | Age: 21
End: 2021-09-15

## 2021-09-15 DIAGNOSIS — M25.511 CHRONIC PAIN OF BOTH SHOULDERS: ICD-10-CM

## 2021-09-15 DIAGNOSIS — M54.2 CERVICALGIA: Primary | ICD-10-CM

## 2021-09-15 DIAGNOSIS — M25.512 CHRONIC PAIN OF BOTH SHOULDERS: ICD-10-CM

## 2021-09-15 DIAGNOSIS — G89.29 CHRONIC PAIN OF BOTH SHOULDERS: ICD-10-CM

## 2021-09-15 PROCEDURE — 97162 PT EVAL MOD COMPLEX 30 MIN: CPT | Performed by: PHYSICAL THERAPIST

## 2021-09-15 NOTE — PROGRESS NOTES
Physical Therapy Initial Evaluation and Plan of Care    Patient: Henrik Soni   : 2000  Diagnosis/ICD-10 Code:  Cervicalgia [M54.2]  Referring practitioner: YANCI Aguero  Date of Initial Visit: 9/15/2021  Today's Date: 9/15/2021  Patient seen for 1 sessions           Subjective Questionnaire: NDI:42%    Subjective Evaluation    History of Present Illness  Mechanism of injury: History of current condition: Presents with neck and bilateral shoulder pain on and off for years, history of right shoulder surgery, labrum repair in . Never fully helped. In the last year her neck pain has gotten pretty bad. During blood work found to have high rheumatoid factor and is being worked up for autoimmune, may be RA. Her neck and shoulder pain is worse in the morning. Has been seeing chiropractor on and off for years which really helps, but insurance now only covers 6 visits per year. Has tried getting a different mattress and new pillow, nothings has helped. Gets n/t into right shoulder and down tricep (has been doing on since shoulder surgery). Both hands go numb 1-2 times per week when her symptoms flair up.     Makes symptoms worse: turning head, sleeping    Makes symptoms better: massage    Reported functional limitation: Unable to work out, used to be an athlete, very disrupted sleep all night long.         Patient Occupation: Works as a  but it makes her pain significantly worse, now only able to work 8-12 hours per week.  Quality of life: good    Pain  Current pain ratin  At best pain ratin  At worst pain ratin    Diagnostic Tests  X-ray: abnormal (slight lordosis reversal, otherwise normal)    Patient Goals  Patient goals for therapy: decreased pain, increased motion, increased strength, independence with ADLs/IADLs, return to work and return to sport/leisure activities         Precautions: being worked up for auto-immune    Objective          Postural Observations  Seated posture:  good  Standing posture: good    Additional Postural Observation Details  Decreased cervical lordosis, decreased thoracic kyphosis,  -palpable step at C7-C6 SP (C6 anteriorly translated on C7)    Palpation   Left   Hypertonic in the upper trapezius.   Tenderness of the cervical paraspinals, levator scapulae, pectoralis minor, suboccipitals and upper trapezius.     Right   Hypertonic in the upper trapezius. Tenderness of the cervical paraspinals, levator scapulae, pectoralis minor, suboccipitals and upper trapezius.     Left Shoulder Comments  Left upper trapezius: muscle knots.     Right Shoulder Comments  Right upper trapezius: muscle knots.     Neurological Testing     Sensation   Cervical/Thoracic   Left   Intact: light touch    Right   Diminished: light touch  Paresthesia: light touch    Comments   Right light touch: C5-C7 distribution diminished & parasthesia.     Reflexes   Left   Deltoid (C5): trace (1+)    Right   Deltoid (C5): normal (2+)    Active Range of Motion   Cervical/Thoracic Spine   Cervical    Left lateral flexion: 30 degrees with pain  Right lateral flexion: 24 degrees   Left rotation: 37 degrees with pain  Right rotation: 38 degrees with pain    Thoracic   Left rotation: 24 (pain in left low back) degrees   Right rotation: 28 degrees with pain  Left Shoulder   Flexion: 134 degrees with pain    Right Shoulder   Flexion: 132 degrees     Additional Active Range of Motion Details  No SP rotation at T1-T3 during cervical rotation either direction    Passive Range of Motion   Left Shoulder   Flexion: 180 degrees   External rotation 90°: 90 degrees     Right Shoulder   Flexion: 170 degrees with pain  External rotation 90°: 85 degrees     Additional Passive Range of Motion Details  Hypomobility & pain C3-C6 & T1-T5    Strength/Myotome Testing     Left Shoulder     Planes of Motion   Flexion: 4- (painful)     Right Shoulder     Planes of Motion   Flexion: 4- (painful)     Tests     Additional Tests  Details  Cervical distraction decreased neck pain but increased feeling of tightness in shoulder.          Assessment & Plan     Assessment  Impairments: abnormal or restricted ROM, activity intolerance, impaired physical strength, lacks appropriate home exercise program and pain with function  Assessment details: The patient is a 20 y.o. female who presents to physical therapy today for worsening neck and shoulder pain with radicular UE symptoms. Currently being worked up for auto-immune disorder, per patient possibly RA. Upon initial evaluation, the patient demonstrates the following impairments: decreased neck & thoracic ROM, decreased shoulder ROM, UE weakness, sensation changes along C5-7 nerve root distribution RUE, pain with movement, postural changes, muscle guarding and knots bilateral upper traps, and hypomobility of PIVMs cervical and thoracic spine. Due to these impairments, the patient is unable to perform or has difficulty with the following functional tasks: turning head, sleeping, working, going to gym, ADLs. The patient would benefit from skilled PT services to address functional limitations and impairments and to improve patient quality of life.      Prognosis: good  Functional Limitations: lifting, sleeping, uncomfortable because of pain, moving in bed, sitting, reaching overhead and unable to perform repetitive tasks  Goals  Plan Goals: STG:  Pt will be independent and compliant with initial HEP in 3 weeks.  Pt will report a 25% improvement in symptoms since starting therapy in 4 weeks.  Pt will report pain level at worst <7 during standing activity in 4 weeks.  Pt will be independent with postural corrections in 2 weeks in order to decrease strain on cervical and thoracic spine.   Pt will increase cervical rotation to > 45 deg bilaterally in 4 weeks.  Pt will improve shoulder flexion bilaterally to >150 deg in 4 weeks.  LTG:  Pt will be independent with final HEP for self-management of condition  by DC.  Pt will improve score on NDI to less than 25% by DC.   Pt will report a 50% improvement in symptoms by DC in order to allow return to PLOF.  Pt will increase cervical rotation AROM to > 150 deg bilaterally in order to improve ability to drive by DC.   Pt will have a 75% reduction in radicular symptoms by DC.  Pt will increase BUE strength to 5/5 by DC without pain.       Plan  Therapy options: will be seen for skilled physical therapy services  Planned modality interventions: cryotherapy, electrical stimulation/Russian stimulation, TENS, thermotherapy (hydrocollator packs), traction, ultrasound and dry needling  Planned therapy interventions: body mechanics training, flexibility, functional ROM exercises, home exercise program, joint mobilization, manual therapy, motor coordination training, neuromuscular re-education, postural training, soft tissue mobilization, spinal/joint mobilization, strengthening, stretching and therapeutic activities  Frequency: 2x week  Duration in visits: 20  Treatment plan discussed with: patient        See flowsheets for treatment detail.    History # of Personal Factors and/or Comorbidities: MODERATE (1-2)  Examination of Body System(s): # of elements: MODERATE (3)  Clinical Presentation: EVOLVING  Clinical Decision Making: MODERATE      Timed:         Manual Therapy:         mins  38424;     Therapeutic Exercise:         mins  08400;     Neuromuscular Connor:        mins  63733;    Therapeutic Activity:          mins  97061;     Gait Training:           mins  80935;     Ultrasound:          mins  63270;    Ionto                                   mins   85989  Self Care                            mins   81083      Un-Timed:  Electrical Stimulation:         mins  89140 ( );  Dry Needling          mins self-pay  Traction          mins 38434  Low Eval          Mins  43050  Mod Eval     45     Mins  48314  High Eval                            Mins  13157  Re-Eval                                mins  11120      Timed Treatment:   0   mins   Total Treatment:     45   mins    PT SIGNATURE: Lindsey Vu, PT   DATE TREATMENT INITIATED: 9/15/2021    Initial Certification  Certification Period: 12/14/2021  I certify that the therapy services are furnished while this patient is under my care.  The services outlined above are required by this patient, and will be reviewed every 90 days.     PHYSICIAN: Jacob Christian PA      DATE:     Please sign and return via fax to [phone].. Thank you, [facility].

## 2021-09-16 ENCOUNTER — TREATMENT (OUTPATIENT)
Dept: PHYSICAL THERAPY | Facility: CLINIC | Age: 21
End: 2021-09-16

## 2021-09-16 DIAGNOSIS — M25.512 CHRONIC PAIN OF BOTH SHOULDERS: ICD-10-CM

## 2021-09-16 DIAGNOSIS — M54.2 CERVICALGIA: Primary | ICD-10-CM

## 2021-09-16 DIAGNOSIS — M25.511 CHRONIC PAIN OF BOTH SHOULDERS: ICD-10-CM

## 2021-09-16 DIAGNOSIS — G89.29 CHRONIC PAIN OF BOTH SHOULDERS: ICD-10-CM

## 2021-09-16 PROCEDURE — 20560 NDL INSJ W/O NJX 1 OR 2 MUSC: CPT | Performed by: PHYSICAL THERAPIST

## 2021-09-16 PROCEDURE — 97110 THERAPEUTIC EXERCISES: CPT | Performed by: PHYSICAL THERAPIST

## 2021-09-16 NOTE — PROGRESS NOTES
"Physical Therapy Daily Progress Note    Patient: Henrik Soni  : 2000  Referring practitioner: No ref. provider found  Today's Date: 2021    VISIT#: 2    Subjective   Henrik Soni reports: Is interested in dry needling but is a little hesitant due to fear of it making her worse.     Objective     See Exercise, Manual, and Modality Logs for complete treatment.     Patient Education: educated on risks/benefits of dry needling    Assessment/Plan  After lengthy discussion with pt educating her on dry needling, she was decided she did want to try it today. She had a good response overall, no adverse effects. Reported some slight \"burning sensation\" along sides of neck a few minutes after treatment.     Progress per Plan of Care            Timed:         Manual Therapy:         mins  56565;     Therapeutic Exercise:    10     mins  77616;     Neuromuscular Connor:        mins  32794;    Therapeutic Activity:          mins  10085;     Gait Training:           mins  36917;     Ultrasound:          mins  42417;    Ionto:                                   mins   03549  Self Care:                            mins   85059    Un-Timed:  Electrical Stimulation:         mins  34841 ( );  Dry Needling     15     mins self-pay  Traction          mins 15053  Re-Eval                               mins  05023    Timed Treatment:   10   mins   Total Treatment:     25   mins    Lindesy Vu, PT  Physical Therapist  "

## 2021-09-20 ENCOUNTER — TREATMENT (OUTPATIENT)
Dept: PHYSICAL THERAPY | Facility: CLINIC | Age: 21
End: 2021-09-20

## 2021-09-20 DIAGNOSIS — M25.511 CHRONIC PAIN OF BOTH SHOULDERS: ICD-10-CM

## 2021-09-20 DIAGNOSIS — G89.29 CHRONIC PAIN OF BOTH SHOULDERS: ICD-10-CM

## 2021-09-20 DIAGNOSIS — M25.512 CHRONIC PAIN OF BOTH SHOULDERS: ICD-10-CM

## 2021-09-20 DIAGNOSIS — M54.2 CERVICALGIA: Primary | ICD-10-CM

## 2021-09-20 PROCEDURE — 97110 THERAPEUTIC EXERCISES: CPT | Performed by: PHYSICAL THERAPIST

## 2021-09-20 PROCEDURE — 97140 MANUAL THERAPY 1/> REGIONS: CPT | Performed by: PHYSICAL THERAPIST

## 2021-09-20 NOTE — PROGRESS NOTES
Physical Therapy Daily Progress Note    Patient: Henrik Soni  : 2000  Referring practitioner: YANCI Aguero  Today's Date: 2021    VISIT#: 3    Subjective   Henrik Soni reports: Did not do well after the dry needling last time, was really sore the rest of that day and the next.       Objective     See Exercise, Manual, and Modality Logs for complete treatment.     Patient Education:    Assessment/Plan  Fair response to session, UT stretch caused N/T in finger tips on both sides (RUT/scaplenes tighter). Variable response to cervical traction, intermittently caused increased tension in UTs but with increased cervical flexion (about 30 deg) this decreased. Generally after session she reported increased soreness in her neck and UTs. Had pinching in right shoulder with BUE ER, but resolved w/ towel roll under arm to bring shoulder to neutral position.     Progress per Plan of Care            Timed:         Manual Therapy:    20     mins  36344;     Therapeutic Exercise:    10     mins  14615;     Neuromuscular Connor:        mins  23599;    Therapeutic Activity:          mins  57215;     Gait Training:           mins  47784;     Ultrasound:          mins  14710;    Ionto:                                   mins   26131  Self Care:                            mins   20908    Un-Timed:  Electrical Stimulation:         mins  41221 ( );  Dry Needling          mins self-pay  Traction          mins 60858  Re-Eval                               mins  39552    Timed Treatment:   30   mins   Total Treatment:     30   mins    Lindsey Vu, PT  Physical Therapist

## 2021-09-23 ENCOUNTER — TREATMENT (OUTPATIENT)
Dept: PHYSICAL THERAPY | Facility: CLINIC | Age: 21
End: 2021-09-23

## 2021-09-23 DIAGNOSIS — M54.2 CERVICALGIA: Primary | ICD-10-CM

## 2021-09-23 DIAGNOSIS — G89.29 CHRONIC PAIN OF BOTH SHOULDERS: ICD-10-CM

## 2021-09-23 DIAGNOSIS — M25.512 CHRONIC PAIN OF BOTH SHOULDERS: ICD-10-CM

## 2021-09-23 DIAGNOSIS — M25.511 CHRONIC PAIN OF BOTH SHOULDERS: ICD-10-CM

## 2021-09-23 PROCEDURE — 97110 THERAPEUTIC EXERCISES: CPT | Performed by: PHYSICAL THERAPIST

## 2021-09-23 PROCEDURE — 97140 MANUAL THERAPY 1/> REGIONS: CPT | Performed by: PHYSICAL THERAPIST

## 2021-09-23 NOTE — PROGRESS NOTES
Physical Therapy Daily Progress Note    Patient: Henrik Soni  : 2000  Referring practitioner: YANCI Aguero  Today's Date: 2021    VISIT#: 4    Subjective   Henrik Soni reports: Doing ok, feeling about the same, no significant change better or worse after last time.       Objective     See Exercise, Manual, and Modality Logs for complete treatment.     Patient Education: continue HEP    Assessment/Plan  Good response to session, intermittent cues for form to prevent scapular elevation. More tightness and muscle guarding on left thoracic spine but more pain on right neck.     Progress per Plan of Care            Timed:         Manual Therapy:    15     mins  86976;     Therapeutic Exercise:    10     mins  30262;     Neuromuscular Connor:        mins  28449;    Therapeutic Activity:          mins  06257;     Gait Training:           mins  77445;     Ultrasound:          mins  37848;    Ionto:                                   mins   94361  Self Care:                            mins   68350    Un-Timed:  Electrical Stimulation:         mins  52618 (MC );  Dry Needling          mins self-pay  Traction          mins 16269  Re-Eval                               mins  95581    Timed Treatment:   25   mins   Total Treatment:     25   mins    Lindsey Vu, PT  Physical Therapist

## 2021-09-28 ENCOUNTER — TREATMENT (OUTPATIENT)
Dept: PHYSICAL THERAPY | Facility: CLINIC | Age: 21
End: 2021-09-28

## 2021-09-28 DIAGNOSIS — M25.512 CHRONIC PAIN OF BOTH SHOULDERS: ICD-10-CM

## 2021-09-28 DIAGNOSIS — G89.29 CHRONIC PAIN OF BOTH SHOULDERS: ICD-10-CM

## 2021-09-28 DIAGNOSIS — M54.2 CERVICALGIA: Primary | ICD-10-CM

## 2021-09-28 DIAGNOSIS — M25.511 CHRONIC PAIN OF BOTH SHOULDERS: ICD-10-CM

## 2021-09-28 PROCEDURE — 97110 THERAPEUTIC EXERCISES: CPT | Performed by: PHYSICAL THERAPIST

## 2021-09-28 PROCEDURE — 97140 MANUAL THERAPY 1/> REGIONS: CPT | Performed by: PHYSICAL THERAPIST

## 2021-09-28 PROCEDURE — 97012 MECHANICAL TRACTION THERAPY: CPT | Performed by: PHYSICAL THERAPIST

## 2021-09-28 NOTE — PROGRESS NOTES
Physical Therapy Daily Progress Note    Patient: Henrik Soni  : 2000  Referring practitioner: No ref. provider found  Today's Date: 2021    VISIT#: 5    Subjective   Henrik Soni reports: Felt some relief for about 1 day after last treatment but then pain came back. Really sore today, did a lot of tricep dips yesterday and think that did it.       Objective     See Exercise, Manual, and Modality Logs for complete treatment.     Patient Education: continue HEP    Assessment/Plan  Good relief with manual cervical traction so initiated mechanical traction today. Very good response and she had less lateral neck pain. Continue have increased tension in bilateral UTs with UE exercises even with form correction.     Progress per Plan of Care            Timed:         Manual Therapy:    15     mins  14171;     Therapeutic Exercise:    10     mins  37610;     Neuromuscular Connor:        mins  10133;    Therapeutic Activity:          mins  81442;     Gait Training:           mins  88777;     Ultrasound:          mins  73679;    Ionto:                                   mins   69041  Self Care:                            mins   10001    Un-Timed:  Electrical Stimulation:         mins  40650 ( );  Dry Needling          mins self-pay  Traction     15     mins 61919  Re-Eval                               mins  60408    Timed Treatment:   25   mins   Total Treatment:     40   mins    Lindsey Vu, PT  Physical Therapist

## 2021-09-30 ENCOUNTER — TELEPHONE (OUTPATIENT)
Dept: PHYSICAL THERAPY | Facility: CLINIC | Age: 21
End: 2021-09-30

## 2021-10-04 ENCOUNTER — TREATMENT (OUTPATIENT)
Dept: PHYSICAL THERAPY | Facility: CLINIC | Age: 21
End: 2021-10-04

## 2021-10-04 DIAGNOSIS — M54.2 CERVICALGIA: Primary | ICD-10-CM

## 2021-10-04 DIAGNOSIS — G89.29 CHRONIC PAIN OF BOTH SHOULDERS: ICD-10-CM

## 2021-10-04 DIAGNOSIS — M25.512 CHRONIC PAIN OF BOTH SHOULDERS: ICD-10-CM

## 2021-10-04 DIAGNOSIS — M25.511 CHRONIC PAIN OF BOTH SHOULDERS: ICD-10-CM

## 2021-10-04 PROCEDURE — 97110 THERAPEUTIC EXERCISES: CPT | Performed by: PHYSICAL THERAPIST

## 2021-10-04 PROCEDURE — 97012 MECHANICAL TRACTION THERAPY: CPT | Performed by: PHYSICAL THERAPIST

## 2021-10-04 PROCEDURE — 97140 MANUAL THERAPY 1/> REGIONS: CPT | Performed by: PHYSICAL THERAPIST

## 2021-10-04 NOTE — PROGRESS NOTES
Physical Therapy Daily Progress Note    Patient: Henrik Soni  : 2000  Referring practitioner: YANCI Aguero  Today's Date: 10/4/2021    VISIT#: 6    Subjective   Henrik Soni reports: Got some relief after traction last time, lasted the rest of the day but then next morning was painful again. Left upper trap had big knot a few days ago.       Objective     See Exercise, Manual, and Modality Logs for complete treatment.     Patient Education: Continue HEP    Assessment/Plan  Fair response to session, still had lateral neck pain on both sides after manual therapy and strain in to bilateral upper traps during exercises. Neck muscles fatigue quickly during exercises. Good response to mechanical traction.     Progress per Plan of Care            Timed:         Manual Therapy:    18     mins  29893;     Therapeutic Exercise:    12     mins  01571;     Neuromuscular Connor:        mins  28622;    Therapeutic Activity:          mins  27907;     Gait Training:           mins  51244;     Ultrasound:          mins  48511;    Ionto:                                   mins   73575  Self Care:                            mins   15751    Un-Timed:  Electrical Stimulation:         mins  49459 ( );  Dry Needling          mins self-pay  Traction     15     mins 52166  Re-Eval                               mins  32530    Timed Treatment:   30   mins   Total Treatment:     45   mins    Lindsey Vu, PT  Physical Therapist

## 2021-10-06 ENCOUNTER — TREATMENT (OUTPATIENT)
Dept: PHYSICAL THERAPY | Facility: CLINIC | Age: 21
End: 2021-10-06

## 2021-10-06 DIAGNOSIS — M25.511 CHRONIC PAIN OF BOTH SHOULDERS: ICD-10-CM

## 2021-10-06 DIAGNOSIS — G89.29 CHRONIC PAIN OF BOTH SHOULDERS: ICD-10-CM

## 2021-10-06 DIAGNOSIS — M54.2 CERVICALGIA: Primary | ICD-10-CM

## 2021-10-06 DIAGNOSIS — M25.512 CHRONIC PAIN OF BOTH SHOULDERS: ICD-10-CM

## 2021-10-06 PROCEDURE — 97140 MANUAL THERAPY 1/> REGIONS: CPT | Performed by: PHYSICAL THERAPIST

## 2021-10-06 PROCEDURE — 97012 MECHANICAL TRACTION THERAPY: CPT | Performed by: PHYSICAL THERAPIST

## 2021-10-06 PROCEDURE — 97110 THERAPEUTIC EXERCISES: CPT | Performed by: PHYSICAL THERAPIST

## 2021-10-06 NOTE — PROGRESS NOTES
Physical Therapy Daily Progress Note    Patient: Henrik Soni  : 2000  Referring practitioner: YANCI Aguero  Today's Date: 10/6/2021    VISIT#: 7    Subjective   Henrik Soni reports: Did ok after last session, no better or worse. Woke up really stiff this morning.       Objective     See Exercise, Manual, and Modality Logs for complete treatment.     Patient Education: continue HEP to tolerance.     Assessment/Plan  Fair response to session, continued high pain level throughout and reports of neck and upper trap stiffness. Had some right shoulder discomfort during mechanical cervical traction today, had to decrease resistance from 20# to 15#    Progress per Plan of Care            Timed:         Manual Therapy:    12     mins  40081;     Therapeutic Exercise:    15     mins  84113;     Neuromuscular Connor:        mins  83787;    Therapeutic Activity:          mins  24132;     Gait Training:           mins  58242;     Ultrasound:          mins  53595;    Ionto:                                   mins   56268  Self Care:                            mins   91557    Un-Timed:  Electrical Stimulation:         mins  27230 ( );  Dry Needling          mins self-pay  Traction     15     mins 34669  Re-Eval                               mins  17433    Timed Treatment:   27   mins   Total Treatment:     42   mins    Lindsey Vu, TRACIE  Physical Therapist

## 2021-10-18 ENCOUNTER — TRANSCRIBE ORDERS (OUTPATIENT)
Dept: ADMINISTRATIVE | Facility: HOSPITAL | Age: 21
End: 2021-10-18

## 2021-10-18 DIAGNOSIS — M54.12 CERVICAL RADICULAR PAIN: Primary | ICD-10-CM

## 2021-10-21 ENCOUNTER — TREATMENT (OUTPATIENT)
Dept: PHYSICAL THERAPY | Facility: CLINIC | Age: 21
End: 2021-10-21

## 2021-10-21 DIAGNOSIS — G89.29 CHRONIC PAIN OF BOTH SHOULDERS: ICD-10-CM

## 2021-10-21 DIAGNOSIS — M25.511 CHRONIC PAIN OF BOTH SHOULDERS: ICD-10-CM

## 2021-10-21 DIAGNOSIS — M25.512 CHRONIC PAIN OF BOTH SHOULDERS: ICD-10-CM

## 2021-10-21 DIAGNOSIS — M54.2 CERVICALGIA: Primary | ICD-10-CM

## 2021-10-21 PROCEDURE — 97035 APP MDLTY 1+ULTRASOUND EA 15: CPT | Performed by: PHYSICAL THERAPIST

## 2021-10-21 PROCEDURE — 97530 THERAPEUTIC ACTIVITIES: CPT | Performed by: PHYSICAL THERAPIST

## 2021-10-21 NOTE — PROGRESS NOTES
Physical Therapy Daily Progress Note    Patient: Henrik Soni  : 2000  Referring practitioner: YANCI Aguero  Today's Date: 10/21/2021    VISIT#: 8    Subjective   Henrik Soni reports: Says the week has been pretty bad. Getting MRI , f/u with spine doctor (Jacob Christian) on . Says she gets relief from therapy sessions but then tightens up again. About 15% improved since starting therapy.       Objective          Postural Observations  Seated posture: good  Standing posture: good    Additional Postural Observation Details  Decreased cervical lordosis, decreased thoracic kyphosis,  -palpable step at C7-C6 SP (C6 anteriorly translated on C7)    Palpation   Left   Hypertonic in the upper trapezius.   Tenderness of the cervical paraspinals, levator scapulae, pectoralis minor, suboccipitals and upper trapezius.     Right   Hypertonic in the upper trapezius. Tenderness of the cervical paraspinals, levator scapulae, pectoralis minor, suboccipitals and upper trapezius.     Left Shoulder Comments  Left upper trapezius: muscle knots.     Right Shoulder Comments  Right upper trapezius: muscle knots.     Neurological Testing     Sensation   Cervical/Thoracic   Left   Intact: light touch    Right   Diminished: light touch  Paresthesia: light touch    Comments   Right light touch: C5-C7 distribution diminished & parasthesia.     Reflexes   Left   Biceps (C5/C6): trace (1+)    Right   Biceps (C5/C6): normal (2+)    Active Range of Motion   Cervical/Thoracic Spine   Cervical    Left lateral flexion: 30 degrees with pain  Right lateral flexion: 24 degrees   Left rotation: 40 degrees with pain  Right rotation: 45 degrees with pain    Thoracic   Left rotation: 24 (pain in left low back) degrees   Right rotation: 28 degrees with pain  Left Shoulder   Flexion: 114 degrees with pain    Right Shoulder   Flexion: 110 degrees     Passive Range of Motion     Additional Passive Range of Motion Details  Hypomobility  & pain C3-C6 & T1-T5    Strength/Myotome Testing     Left Shoulder     Planes of Motion   Flexion: 4 (painful in elbow)     Right Shoulder     Planes of Motion   Flexion: 4- (painful in shoulder/trap)         See Exercise, Manual, and Modality Logs for complete treatment.     Patient Education: continue HEP    Assessment & Plan     Assessment  Assessment details: Henrik has seen variable progress with therapy. Overall she has seen some improvement in her symptoms, but they keep returning. She is consistently performing her HEP and gets some relief of symptoms. Overall her neck ROM has improved with right rotation, but otherwise no change. Initiated ultrasound treatment today and reported less tightness after. Would benefit from continued therapy as she is still limited in her ability to work and function due to high pain level.     Goals  Plan Goals: STG:  Pt will be independent and compliant with initial HEP in 3 weeks. MET  Pt will report a 25% improvement in symptoms since starting therapy in 4 weeks. NOT MET  Pt will report pain level at worst <7 during standing activity in 4 weeks. NOT MET (8/10)  Pt will be independent with postural corrections in 2 weeks in order to decrease strain on cervical and thoracic spine. MET  Pt will increase cervical rotation to > 45 deg bilaterally in 4 weeks. NOT MET  Pt will improve shoulder flexion bilaterally to >150 deg in 4 weeks. NOT MET  LTG: NOT MET  Pt will be independent with final HEP for self-management of condition by DC.  Pt will improve score on NDI to less than 25% by DC.   Pt will report a 50% improvement in symptoms by DC in order to allow return to PLOF.  Pt will increase cervical rotation AROM to > 150 deg bilaterally in order to improve ability to drive by DC.   Pt will have a 75% reduction in radicular symptoms by DC.  Pt will increase BUE strength to 5/5 by DC without pain. NOT MET    Plan  Therapy options: will be seen for skilled physical therapy  services  Frequency: 2x week          Progress per Plan of Care            Timed:         Manual Therapy:         mins  78209;     Therapeutic Exercise:         mins  37044;     Neuromuscular Connor:        mins  81417;    Therapeutic Activity:     15     mins  07585;     Gait Training:           mins  82995;     Ultrasound:     13     mins  26972;    Ionto:                                   mins   35727  Self Care:                            mins   22947    Un-Timed:  Electrical Stimulation:         mins  91523 ( );  Dry Needling          mins self-pay  Traction          mins 21384  Re-Eval                               mins  53122    Timed Treatment:   28   mins   Total Treatment:     28   mins    Lindsey Vu, PT  Physical Therapist

## 2021-10-28 ENCOUNTER — TREATMENT (OUTPATIENT)
Dept: PHYSICAL THERAPY | Facility: CLINIC | Age: 21
End: 2021-10-28

## 2021-10-28 DIAGNOSIS — M25.512 CHRONIC PAIN OF BOTH SHOULDERS: ICD-10-CM

## 2021-10-28 DIAGNOSIS — M54.2 CERVICALGIA: Primary | ICD-10-CM

## 2021-10-28 DIAGNOSIS — G89.29 CHRONIC PAIN OF BOTH SHOULDERS: ICD-10-CM

## 2021-10-28 DIAGNOSIS — M25.511 CHRONIC PAIN OF BOTH SHOULDERS: ICD-10-CM

## 2021-10-28 PROCEDURE — 97110 THERAPEUTIC EXERCISES: CPT | Performed by: PHYSICAL THERAPIST

## 2021-10-28 NOTE — PROGRESS NOTES
Physical Therapy Daily Progress Note    Patient: Henrik Soni  : 2000  Referring practitioner: YANCI Aguero  Today's Date: 10/28/2021    VISIT#: 9    Subjective   Henrik Soni reports: Actually did get some more lasting relief after last session with the ultrasound. Was still more tight the next day but not as much as usual.       Objective     See Exercise, Manual, and Modality Logs for complete treatment.     Patient Education:    Assessment/Plan  Again ended session with ultrasound due to patient having more relief of pain with that than any other treatment we had performed to date.     Progress per Plan of Care; progress note next session.             Timed:         Manual Therapy:         mins  02728;     Therapeutic Exercise:    23     mins  84265;     Neuromuscular Connor:        mins  97464;    Therapeutic Activity:          mins  03396;     Gait Training:           mins  54718;     Ultrasound:     10     mins  51502;    Ionto:                                   mins   40906  Self Care:                            mins   58101    Un-Timed:  Electrical Stimulation:         mins  42528 (MC );  Dry Needling          mins self-pay  Traction          mins 11331  Re-Eval                               mins  91432    Timed Treatment:   33   mins   Total Treatment:     33   mins    Lindsey Vu, PT  Physical Therapist

## 2021-11-09 ENCOUNTER — TREATMENT (OUTPATIENT)
Dept: PHYSICAL THERAPY | Facility: CLINIC | Age: 21
End: 2021-11-09

## 2021-11-09 DIAGNOSIS — G89.29 CHRONIC PAIN OF BOTH SHOULDERS: ICD-10-CM

## 2021-11-09 DIAGNOSIS — M25.512 CHRONIC PAIN OF BOTH SHOULDERS: ICD-10-CM

## 2021-11-09 DIAGNOSIS — M54.2 CERVICALGIA: Primary | ICD-10-CM

## 2021-11-09 DIAGNOSIS — M25.511 CHRONIC PAIN OF BOTH SHOULDERS: ICD-10-CM

## 2021-11-09 PROCEDURE — 97110 THERAPEUTIC EXERCISES: CPT | Performed by: PHYSICAL THERAPIST

## 2021-11-09 PROCEDURE — 97530 THERAPEUTIC ACTIVITIES: CPT | Performed by: PHYSICAL THERAPIST

## 2021-11-09 NOTE — PROGRESS NOTES
Physical Therapy Progress Note    Patient: Henrik Soni  : 2000  Referring practitioner: YANCI Aguero  Today's Date: 2021    VISIT#: 10    Subjective   Henrik Soni reports: Has been stretching before bed and thinks that is helping some. Since starting therapy says she is about 20% improved since starting therapy. The severity of her pain isn't quite as often.     NDI: 42%    Objective          Postural Observations  Seated posture: good  Standing posture: good    Additional Postural Observation Details  Decreased cervical lordosis, decreased thoracic kyphosis,  -palpable step at C7-C6 SP (C6 anteriorly translated on C7)    Palpation   Left   Hypertonic in the upper trapezius.   Tenderness of the cervical paraspinals, levator scapulae, pectoralis minor, suboccipitals and upper trapezius.     Right   Hypertonic in the upper trapezius. Tenderness of the cervical paraspinals, levator scapulae, pectoralis minor, suboccipitals and upper trapezius.     Left Shoulder Comments  Left upper trapezius: muscle knots.     Right Shoulder Comments  Right upper trapezius: muscle knots.     Neurological Testing     Sensation   Cervical/Thoracic   Left   Intact: light touch    Right   Diminished: light touch  Paresthesia: light touch    Comments   Right light touch: C5-C7 distribution diminished & parasthesia.     Active Range of Motion   Cervical/Thoracic Spine   Cervical    Left lateral flexion: 28 degrees with pain  Right lateral flexion: 22 degrees   Left rotation: 28 degrees with pain  Right rotation: 48 degrees with pain    Thoracic   Left rotation: 38 (pain in low back) degrees   Right rotation: 35 (pain in low back) degrees with pain  Left Shoulder   Flexion: 100 degrees with pain    Right Shoulder   Flexion: 108 degrees with pain    Passive Range of Motion     Additional Passive Range of Motion Details  Hypomobility & pain C3-C6 & T1-T5    Strength/Myotome Testing     Left Shoulder     Planes of Motion    Flexion: 4- (painful in elbow)     Right Shoulder     Planes of Motion   Flexion: 4- (painful in shoulder/trap)         See Exercise, Manual, and Modality Logs for complete treatment.     Patient Education: reviewed HEP, discussed gym exercises. Instructed in proper form and alignment for planks and instructed to avoid overhead press. She demonstrated shoulder stretched she does and I made adjustments to them.    Assessment & Plan     Assessment  Assessment details: Henrik has started to see some more consistent relief of symptoms with addition of ultrasound treatment and less muscle tension. She demonstrates improve thoracic rotation. Progress limited since last assessment due to patient was only approved for 2 visits. Would benefit from continued therapy as she is still limited in her ability to work and function due to high pain level. She has MRI and appointment with spine specialist next week.     Goals  Plan Goals: STG:  Pt will be independent and compliant with initial HEP in 3 weeks. MET  Pt will report a 25% improvement in symptoms since starting therapy in 4 weeks. NOT MET  Pt will report pain level at worst <7 during standing activity in 4 weeks. NOT MET (7/10)  Pt will be independent with postural corrections in 2 weeks in order to decrease strain on cervical and thoracic spine. MET  Pt will increase cervical rotation to > 45 deg bilaterally in 4 weeks. NOT MET  Pt will improve shoulder flexion bilaterally to >150 deg in 4 weeks. NOT MET  LTG: NOT MET  Pt will be independent with final HEP for self-management of condition by DC.   Pt will improve score on NDI to less than 25% by DC. NOT MET  Pt will report a 50% improvement in symptoms by DC in order to allow return to PLOF. NOT MET  Pt will increase cervical rotation AROM to >50 deg bilaterally in order to improve ability to drive by DC. NOT MET   Pt will have a 75% reduction in radicular symptoms by DC. NOT MET  Pt will increase BUE strength to 5/5  by DC without pain. NOT MET    Plan  Therapy options: will be seen for skilled physical therapy services  Frequency: 2x week          Progress per Plan of Care            Timed:         Manual Therapy:         mins  25748;     Therapeutic Exercise:    12     mins  68368;     Neuromuscular Connor:        mins  62852;    Therapeutic Activity:      11    mins  37092;     Gait Training:           mins  04265;     Ultrasound:      10    mins  44006;    Ionto:                                   mins   54327  Self Care:                            mins   57798    Un-Timed:  Electrical Stimulation:         mins  36476 ( );  Dry Needling          mins self-pay  Traction          mins 49866  Re-Eval                               mins  96418    Timed Treatment:   33   mins   Total Treatment:     33   mins    Lindsey Vu, PT  Physical Therapist

## 2021-11-12 ENCOUNTER — HOSPITAL ENCOUNTER (OUTPATIENT)
Dept: MRI IMAGING | Facility: HOSPITAL | Age: 21
Discharge: HOME | End: 2021-11-12
Admitting: PHYSICIAN ASSISTANT

## 2021-11-12 DIAGNOSIS — M54.12 CERVICAL RADICULAR PAIN: ICD-10-CM

## 2021-11-12 PROCEDURE — 72141 MRI NECK SPINE W/O DYE: CPT

## 2021-11-15 DIAGNOSIS — R59.1 LYMPHADENOPATHY: Primary | ICD-10-CM

## 2021-11-22 DIAGNOSIS — R59.1 LYMPHADENOPATHY: Primary | ICD-10-CM

## 2021-11-23 ENCOUNTER — TREATMENT (OUTPATIENT)
Dept: PHYSICAL THERAPY | Facility: CLINIC | Age: 21
End: 2021-11-23

## 2021-11-23 DIAGNOSIS — G89.29 CHRONIC PAIN OF BOTH SHOULDERS: ICD-10-CM

## 2021-11-23 DIAGNOSIS — M54.2 CERVICALGIA: Primary | ICD-10-CM

## 2021-11-23 DIAGNOSIS — M25.512 CHRONIC PAIN OF BOTH SHOULDERS: ICD-10-CM

## 2021-11-23 DIAGNOSIS — M25.511 CHRONIC PAIN OF BOTH SHOULDERS: ICD-10-CM

## 2021-11-23 PROCEDURE — 97035 APP MDLTY 1+ULTRASOUND EA 15: CPT | Performed by: PHYSICAL THERAPIST

## 2021-11-23 PROCEDURE — 97110 THERAPEUTIC EXERCISES: CPT | Performed by: PHYSICAL THERAPIST

## 2021-11-23 NOTE — PROGRESS NOTES
Physical Therapy Daily Progress Note    Patient: Henrik Soni  : 2000  Referring practitioner: YANCI Aguero  Today's Date: 2021    VISIT#: 11    Subjective   Henrik Soni reports: Trying to get back in with rheumatologist. Talked to Jacob Christian about MRI who said there was some narrowing, but nothing major. Offered possibility of injections but she wants to hold off until seeing rheumatologist.       Objective     See Exercise, Manual, and Modality Logs for complete treatment.     Patient Education: Continue HEP    Assessment/Plan  Good response to session, less tension after.    Progress per Plan of Care            Timed:         Manual Therapy:         mins  88271;     Therapeutic Exercise:    15     mins  51032;     Neuromuscular Connor:        mins  94716;    Therapeutic Activity:          mins  72153;     Gait Training:           mins  57268;     Ultrasound:     12     mins  76591;    Ionto:                                   mins   96947  Self Care:                            mins   86276    Un-Timed:  Electrical Stimulation:         mins  28699 ( );  Dry Needling          mins self-pay  Traction          mins 52127  Re-Eval                               mins  42972    Timed Treatment:   27   mins   Total Treatment:     27   mins    Lindsey Vu, PT  Physical Therapist  [place] [facility] license #: [id number]

## 2021-11-24 ENCOUNTER — APPOINTMENT (OUTPATIENT)
Dept: CT IMAGING | Facility: HOSPITAL | Age: 21
End: 2021-11-24

## 2021-11-24 ENCOUNTER — HOSPITAL ENCOUNTER (OUTPATIENT)
Dept: CT IMAGING | Facility: HOSPITAL | Age: 21
Discharge: HOME | End: 2021-11-24
Admitting: NURSE PRACTITIONER

## 2021-11-24 DIAGNOSIS — R59.1 LYMPHADENOPATHY: ICD-10-CM

## 2021-11-24 PROCEDURE — 0 IOPAMIDOL PER 1 ML: Performed by: NURSE PRACTITIONER

## 2021-11-24 PROCEDURE — 74177 CT ABD & PELVIS W/CONTRAST: CPT

## 2021-11-24 RX ADMIN — IOPAMIDOL 100 ML: 755 INJECTION, SOLUTION INTRAVENOUS at 15:19

## 2021-12-01 ENCOUNTER — TREATMENT (OUTPATIENT)
Dept: PHYSICAL THERAPY | Facility: CLINIC | Age: 21
End: 2021-12-01

## 2021-12-01 DIAGNOSIS — G89.29 CHRONIC PAIN OF BOTH SHOULDERS: ICD-10-CM

## 2021-12-01 DIAGNOSIS — M25.512 CHRONIC PAIN OF BOTH SHOULDERS: ICD-10-CM

## 2021-12-01 DIAGNOSIS — M54.2 CERVICALGIA: Primary | ICD-10-CM

## 2021-12-01 DIAGNOSIS — M25.511 CHRONIC PAIN OF BOTH SHOULDERS: ICD-10-CM

## 2021-12-01 PROCEDURE — 97110 THERAPEUTIC EXERCISES: CPT | Performed by: PHYSICAL THERAPIST

## 2021-12-01 PROCEDURE — 97035 APP MDLTY 1+ULTRASOUND EA 15: CPT | Performed by: PHYSICAL THERAPIST

## 2021-12-01 NOTE — PROGRESS NOTES
Physical Therapy Progress Note    Patient: Henrik Soni  : 2000  Referring practitioner: YANCI Aguero  Today's Date: 2021    VISIT#: 12    Subjective   Henrik Soni reports: Got some really good relief after last session, pain was back the next morning, but definitely didn't feel as tight after last session.       Objective          Postural Observations  Seated posture: good  Standing posture: good    Additional Postural Observation Details  Decreased cervical lordosis, decreased thoracic kyphosis,  -palpable step at C7-C6 SP (C6 anteriorly translated on C7)    Palpation   Left   Hypertonic in the upper trapezius.   Tenderness of the cervical paraspinals, levator scapulae, pectoralis minor, suboccipitals and upper trapezius.     Right   Hypertonic in the upper trapezius. Tenderness of the cervical paraspinals, levator scapulae, pectoralis minor, suboccipitals and upper trapezius.     Left Shoulder Comments  Left upper trapezius: muscle knots.     Right Shoulder Comments  Right upper trapezius: muscle knots.     Neurological Testing     Sensation   Cervical/Thoracic   Left   Intact: light touch    Right   Diminished: light touch  Paresthesia: light touch    Comments   Right light touch: C5-C7 distribution diminished & parasthesia.     Active Range of Motion   Cervical/Thoracic Spine   Cervical    Left lateral flexion: 28 degrees with pain  Right lateral flexion: 22 degrees   Left rotation: 28 degrees with pain  Right rotation: 48 degrees with pain    Thoracic   Left rotation: 38 (pain in low back) degrees   Right rotation: 35 (pain in low back) degrees with pain  Left Shoulder   Flexion: 100 degrees with pain    Right Shoulder   Flexion: 108 degrees with pain    Passive Range of Motion     Additional Passive Range of Motion Details  Hypomobility & pain C3-C6 & T1-T5    Strength/Myotome Testing     Left Shoulder     Planes of Motion   Flexion: 4- (painful in elbow)     Right Shoulder     Planes  of Motion   Flexion: 4- (painful in shoulder/trap)         See Exercise, Manual, and Modality Logs for complete treatment.     Patient Education: Continue HEP.    Assessment & Plan     Assessment    Assessment details: Henrik continues to see some more consistent relief of symptoms with addition of ultrasound treatment and scapular stretching on 1/2 foam roll. She demonstrates improve thoracic rotation and improve shoulder flexion. Would benefit from continued therapy as she is still limited in her ability to work and function due to high pain level. She is still waiting to get in to see rheumatologist.     Goals  Plan Goals: STG:  Pt will be independent and compliant with initial HEP in 3 weeks. MET  Pt will report a 25% improvement in symptoms since starting therapy in 4 weeks. NOT MET  Pt will report pain level at worst <7 during standing activity in 4 weeks. NOT MET (7/10)  Pt will be independent with postural corrections in 2 weeks in order to decrease strain on cervical and thoracic spine. MET  Pt will increase cervical rotation to > 45 deg bilaterally in 4 weeks. NOT MET  Pt will improve shoulder flexion bilaterally to >150 deg in 4 weeks. NOT MET  LTG: NOT MET  Pt will be independent with final HEP for self-management of condition by DC.   Pt will improve score on NDI to less than 25% by DC. NOT MET  Pt will report a 50% improvement in symptoms by DC in order to allow return to PLOF. NOT MET  Pt will increase cervical rotation AROM to >50 deg bilaterally in order to improve ability to drive by DC. NOT MET   Pt will have a 75% reduction in radicular symptoms by DC. NOT MET  Pt will increase BUE strength to 5/5 by DC without pain. NOT MET    Plan  Therapy options: will be seen for skilled therapy services  Frequency: 2x week          Progress per Plan of Care            Timed:         Manual Therapy:         mins  05411;     Therapeutic Exercise:    15     mins  89011;     Neuromuscular Connor:        mins   37602;    Therapeutic Activity:          mins  69389;     Gait Training:           mins  77096;     Ultrasound:     12     mins  80526;    Ionto:                                   mins   08855  Self Care:                            mins   30790    Un-Timed:  Electrical Stimulation:         mins  03600 ( );  Dry Needling          mins self-pay  Traction          mins 97484  Re-Eval                               mins  14930    Timed Treatment:   15   mins   Total Treatment:     27   mins    Lindsey Vu PT  Physical Therapist  [facility] HODGES license #: [id number]

## 2021-12-03 ENCOUNTER — TREATMENT (OUTPATIENT)
Dept: PHYSICAL THERAPY | Facility: CLINIC | Age: 21
End: 2021-12-03

## 2021-12-03 DIAGNOSIS — M54.2 CERVICALGIA: Primary | ICD-10-CM

## 2021-12-03 PROCEDURE — 97140 MANUAL THERAPY 1/> REGIONS: CPT | Performed by: PHYSICAL THERAPIST

## 2021-12-03 PROCEDURE — 97035 APP MDLTY 1+ULTRASOUND EA 15: CPT | Performed by: PHYSICAL THERAPIST

## 2021-12-03 NOTE — PROGRESS NOTES
Physical Therapy Daily Progress Note      Patient: Henrik Soni   : 2000  Diagnosis/ICD-10 Code:  Cervicalgia [M54.2]  Referring practitioner: YANCI Aguero  Date of Initial Visit: 12/3/2021  Today's Date: 12/3/2021  Patient seen for 13 sessions.  POC exp 21    Insurance 3, exp. 21        VISIT#: 13    NDI 46%    Subjective :  Pain 6/10 B UT's.  She was getting weekly massages but can no longer get them due to finances.  The massages really helped with pain.       Objective     See Exercise, Manual, and Modality Logs for complete treatment. Added Manual.       Patient Education:  Spent  A significant amount of time with patient education.  She was instructed in proper posture vs. Slouch, lumbar support and using towel roll as one.  Also, using towel roll in pillow for cervical support.       Assessment/Plan:  Trialed  IASTM following ultrasound with positive benefits. Pt. Pain decreased to 5/10 with improvement in flexibility following session.       Progress per Plan of Care            Timed:         Manual Therapy:  15       mins  18536;     Therapeutic Exercise:     5    mins  52869;     Neuromuscular Connor:        mins  14807;    Therapeutic Activity:          mins  97944;     Gait Training:           mins  70362;     Ultrasound:    10      mins  98323;    Ionto                                   mins   88181  Self Care                            mins   29280  Aquatic                               mins 36518    Un-Timed:  Electrical Stimulation:         mins  88522 ( );  Traction          mins 86911      30    mins   Total Treatment:  30     mins    Tatyana Puente PTA  Physical Therapist Assistant   [place] license:  #[id number]

## 2021-12-07 DIAGNOSIS — F41.9 ANXIETY: Primary | ICD-10-CM

## 2021-12-14 ENCOUNTER — DOCUMENTATION (OUTPATIENT)
Dept: PHYSICAL THERAPY | Facility: CLINIC | Age: 21
End: 2021-12-14

## 2021-12-14 ENCOUNTER — TELEPHONE (OUTPATIENT)
Dept: PHYSICAL THERAPY | Facility: CLINIC | Age: 21
End: 2021-12-14

## 2021-12-14 NOTE — TELEPHONE ENCOUNTER
Called patient and left voicemail letting her know that her insurance denied further authorization for physical therapy.

## 2022-02-17 NOTE — PROGRESS NOTES
Discharge Summary  Discharge Summary from Physical/Occupational Therapy Report    Patient: Henrik Soni   : 2000  Today's Date: 2022    Patient seen for 13 visits.  Dates of Service: 9/15/21 - 12/3/21    Discharge Status of Patient: Insurance denied further treatment.    Goals: Partially Met    Discharge Plan: Continue with current home exercise program as instructed  Patient to return to referring/providing physician        Thank you for this referral to [facility].    SIGNATURE: Lindsey Vu PT

## 2024-08-21 PROCEDURE — 87591 N.GONORRHOEAE DNA AMP PROB: CPT | Performed by: NURSE PRACTITIONER

## 2024-08-21 PROCEDURE — 87491 CHLMYD TRACH DNA AMP PROBE: CPT | Performed by: NURSE PRACTITIONER

## 2024-08-21 PROCEDURE — 87661 TRICHOMONAS VAGINALIS AMPLIF: CPT | Performed by: NURSE PRACTITIONER

## 2024-08-23 ENCOUNTER — TELEPHONE (OUTPATIENT)
Dept: URGENT CARE | Facility: CLINIC | Age: 24
End: 2024-08-23
Payer: MEDICAID

## 2024-08-23 NOTE — TELEPHONE ENCOUNTER
"Was going thru our lab book and pt's results was documented on whether she has been notified of them. Looked up patient lab and saw she saw them on her mychart but no note was made and our book wasn't marked as \"seen thru mychart.\"  "

## 2025-03-24 PROCEDURE — 87081 CULTURE SCREEN ONLY: CPT | Performed by: PHYSICIAN ASSISTANT

## 2025-03-25 ENCOUNTER — PATIENT ROUNDING (BHMG ONLY) (OUTPATIENT)
Dept: URGENT CARE | Facility: CLINIC | Age: 25
End: 2025-03-25
Payer: MEDICAID

## 2025-03-25 NOTE — ED NOTES
Thank you for letting us care for you in your recent visit to our urgent care center. We would love to hear about your experience with us. Was this the first time you have visited our location?    We’re always looking for ways to make our patients’ experiences even better. Do you have any recommendations on ways we may improve?     I appreciate you taking the time to respond. Please be on the lookout for a survey about your recent visit from [facility] Kuaidi Dache via text or email. We would greatly appreciate if you could fill that out and turn it back in. We want your voice to be heard and we value your feedback.     Thank you for choosing [facility].     Jane Practice Manager

## 2025-04-09 ENCOUNTER — HOSPITAL ENCOUNTER (EMERGENCY)
Facility: HOSPITAL | Age: 25
Discharge: HOME | End: 2025-04-09
Admitting: EMERGENCY MEDICINE
Payer: MEDICAID

## 2025-04-09 ENCOUNTER — APPOINTMENT (OUTPATIENT)
Dept: CT IMAGING | Facility: HOSPITAL | Age: 25
End: 2025-04-09
Payer: MEDICAID

## 2025-04-09 VITALS
TEMPERATURE: 97.8 F | DIASTOLIC BLOOD PRESSURE: 72 MMHG | SYSTOLIC BLOOD PRESSURE: 114 MMHG | HEART RATE: 60 BPM | HEIGHT: 68 IN | OXYGEN SATURATION: 99 % | RESPIRATION RATE: 20 BRPM | BODY MASS INDEX: 19.78 KG/M2 | WEIGHT: 130.51 LBS

## 2025-04-09 DIAGNOSIS — S00.03XA CONTUSION OF SCALP, INITIAL ENCOUNTER: ICD-10-CM

## 2025-04-09 DIAGNOSIS — R51.9 ACUTE NONINTRACTABLE HEADACHE, UNSPECIFIED HEADACHE TYPE: ICD-10-CM

## 2025-04-09 DIAGNOSIS — S09.90XA MINOR CLOSED HEAD INJURY: Primary | ICD-10-CM

## 2025-04-09 PROCEDURE — 99284 EMERGENCY DEPT VISIT MOD MDM: CPT

## 2025-04-09 PROCEDURE — 93005 ELECTROCARDIOGRAM TRACING: CPT

## 2025-04-09 PROCEDURE — 70450 CT HEAD/BRAIN W/O DYE: CPT

## 2025-04-09 PROCEDURE — 63710000001 ONDANSETRON ODT 4 MG TABLET DISPERSIBLE: Performed by: NURSE PRACTITIONER

## 2025-04-09 RX ORDER — ONDANSETRON 4 MG/1
4 TABLET, ORALLY DISINTEGRATING ORAL ONCE
Status: COMPLETED | OUTPATIENT
Start: 2025-04-09 | End: 2025-04-09

## 2025-04-09 RX ORDER — DICLOFENAC SODIUM 75 MG/1
75 TABLET, DELAYED RELEASE ORAL 2 TIMES DAILY PRN
Qty: 20 TABLET | Refills: 0 | Status: SHIPPED | OUTPATIENT
Start: 2025-04-09

## 2025-04-09 RX ORDER — KETOROLAC TROMETHAMINE 30 MG/ML
15 INJECTION, SOLUTION INTRAMUSCULAR; INTRAVENOUS ONCE
Status: DISCONTINUED | OUTPATIENT
Start: 2025-04-09 | End: 2025-04-09 | Stop reason: HOSPADM

## 2025-04-09 RX ORDER — HYDROCODONE BITARTRATE AND ACETAMINOPHEN 5; 325 MG/1; MG/1
1 TABLET ORAL ONCE
Refills: 0 | Status: COMPLETED | OUTPATIENT
Start: 2025-04-09 | End: 2025-04-09

## 2025-04-09 RX ADMIN — ONDANSETRON 4 MG: 4 TABLET, ORALLY DISINTEGRATING ORAL at 19:41

## 2025-04-09 RX ADMIN — HYDROCODONE BITARTRATE AND ACETAMINOPHEN 1 TABLET: 5; 325 TABLET ORAL at 19:41

## 2025-04-09 NOTE — Clinical Note
[facility] EMERGENCY DEPARTMENT  [address] [place] [address]  Phone: [phone]    Henrik Soni was seen and treated in our emergency department on 4/9/2025.  She may return to work on 04/11/2025.         Thank you for choosing [facility].    Karly Gray RN

## 2025-04-09 NOTE — DISCHARGE INSTRUCTIONS
Push clear liquids.    Use Voltaren as needed for discomfort you can use this with a gram of Tylenol 3 times a day for pain control    You can also use Benadryl 25 mg every 8 hours to help with discomfort    Follow-up with your primary care provider if not improving in 3 to 5 days

## 2025-04-09 NOTE — Clinical Note
[facility] EMERGENCY DEPARTMENT  [address] [place] [address]  Phone: [phone]    Henrik Soni was seen and treated in our emergency department on 4/9/2025.  She may return to work on 04/11/2025.         Thank you for choosing [facility].    Karly Gray RN

## 2025-04-09 NOTE — ED PROVIDER NOTES
Subjective   History of Present Illness  Patient is a 24-year-old female who states she watched her sister get an epidural for delivery on Monday and had a syncopal episode where she fell and struck the left side of her      Review of Systems   Neurological:  Positive for headaches.       Past Medical History:   Diagnosis Date    Anxiety     Depression     Fibromyalgia     Miscarriage        Allergies   Allergen Reactions    Latex Hives    Menthol Hives and GI Intolerance       Past Surgical History:   Procedure Laterality Date    SHOULDER ARTHROSCOPY         Family History   Problem Relation Age of Onset    Mental illness Mother     Stroke Mother     Heart disease Father     Hypertension Father     Mental illness Sister     Cancer Maternal Grandmother         colon    Cancer Maternal Grandfather         bone    Cancer Paternal Grandfather         prostate       Social History     Socioeconomic History    Marital status: Single   Tobacco Use    Smoking status: Never     Passive exposure: Past    Smokeless tobacco: Never   Vaping Use    Vaping status: Former    Substances: Nicotine, Flavoring    Devices: Refillable tank    Passive vaping exposure: Yes   Substance and Sexual Activity    Alcohol use: Yes     Alcohol/week: 10.0 standard drinks of alcohol     Types: 10 Shots of liquor per week     Comment: drinks once a week    Drug use: Yes     Types: Marijuana     Comment: regularly-pain management    Sexual activity: Yes     Partners: Male     Birth control/protection: None           Objective   Physical Exam  Vitals reviewed.   Constitutional:       Appearance: Normal appearance. She is well-developed.   HENT:      Head: Normocephalic.      Comments: Right sided parietal contusion noted no ecchymosis tenderness to palpation     Right Ear: Tympanic membrane and external ear normal.      Left Ear: Tympanic membrane and external ear normal.      Mouth/Throat:      Mouth: Mucous membranes are moist.   Eyes:       "Conjunctiva/sclera: Conjunctivae normal.      Pupils: Pupils are equal, round, and reactive to light.   Cardiovascular:      Rate and Rhythm: Normal rate and regular rhythm.      Heart sounds: Normal heart sounds.   Pulmonary:      Effort: Pulmonary effort is normal. No respiratory distress.      Breath sounds: Normal breath sounds. No wheezing.   Abdominal:      Palpations: Abdomen is soft.      Tenderness: There is abdominal tenderness.   Musculoskeletal:         General: No deformity. Normal range of motion.      Cervical back: Normal range of motion and neck supple.   Skin:     General: Skin is warm and dry.      Capillary Refill: Capillary refill takes less than 2 seconds.   Neurological:      General: No focal deficit present.      Mental Status: She is alert and oriented to person, place, and time.      GCS: GCS eye subscore is 4. GCS verbal subscore is 5. GCS motor subscore is 6.      Motor: No weakness.   Psychiatric:         Mood and Affect: Mood normal.         Behavior: Behavior normal.         Procedures           ED Course  ED Course as of 04/09/25 2350   Wed Apr 09, 2025   1749 Marked ready for CT [KW]      ED Course User Index  [KW] Lady Berkowitz D, APRN      /72   Pulse 60   Temp 97.8 °F (36.6 °C) (Temporal)   Resp 20   Ht 172.7 cm (68\")   Wt 59.2 kg (130 lb 8.2 oz)   LMP 04/09/2025   SpO2 99%   BMI 19.84 kg/m²   Labs Reviewed - No data to display  Medications   HYDROcodone-acetaminophen (NORCO) 5-325 MG per tablet 1 tablet (1 tablet Oral Given 4/9/25 1941)   ondansetron ODT (ZOFRAN-ODT) disintegrating tablet 4 mg (4 mg Oral Given 4/9/25 1941)     CT Head Without Contrast  Result Date: 4/9/2025  Impression: No acute intracranial abnormality. Electronically Signed: Juwan Ang MD  4/9/2025 6:46 PM EDT  Workstation ID: [id number]                                                     Medical Decision Making  Patient is a 24-year-old female who had a syncopal episode watching her " sister get a epidural earlier this week and fell and struck her head she has had headaches since that time concerning for intercranial hemorrhage or skull fracture or postconcussive syndrome this is not an all-inclusive list.  The patient had above exam and CT was obtained and reviewed and read by myself the radiologist and found to be within normal limits.  The patient was treated for pain here in the emergency room she was discharged home with postconcussive syndromes told to follow-up with primary care and advised to return for any worsening symptoms she verbalized understood discharge instructions was taken home by her father    Problems Addressed:  Acute nonintractable headache, unspecified headache type: complicated acute illness or injury  Contusion of scalp, initial encounter: complicated acute illness or injury  Minor closed head injury: complicated acute illness or injury    Amount and/or Complexity of Data Reviewed  Independent Historian: parent     Details: Father at bedside  Radiology: ordered and independent interpretation performed. Decision-making details documented in ED Course.  ECG/medicine tests: ordered and independent interpretation performed. Decision-making details documented in ED Course.    Risk  OTC drugs.  Prescription drug management.        Final diagnoses:   Minor closed head injury   Contusion of scalp, initial encounter   Acute nonintractable headache, unspecified headache type       ED Disposition  ED Disposition       ED Disposition   Discharge    Condition   Stable    Comment   --               Dukes, Darra, NP-C  [address]  [place] [address] [phone]    In 3 days  If symptoms worsen, As needed         Medication List        New Prescriptions      diclofenac 75 MG EC tablet  Commonly known as: VOLTAREN  Take 1 tablet by mouth 2 (Two) Times a Day As Needed (pain).               Where to Get Your Medications        These medications were sent to  [id number] - [place], [place] - [address] - [phone]  - [phone] FX  [address] [place] [address]      Phone: [phone]   diclofenac 75 MG EC tablet            Lady Berkowitz, APRN  04/09/25 0678

## 2025-04-10 LAB
QT INTERVAL: 399 MS
QTC INTERVAL: 398 MS